# Patient Record
Sex: MALE | Race: WHITE | NOT HISPANIC OR LATINO
[De-identification: names, ages, dates, MRNs, and addresses within clinical notes are randomized per-mention and may not be internally consistent; named-entity substitution may affect disease eponyms.]

---

## 2022-10-26 ENCOUNTER — NON-APPOINTMENT (OUTPATIENT)
Age: 37
End: 2022-10-26

## 2023-03-05 ENCOUNTER — NON-APPOINTMENT (OUTPATIENT)
Age: 38
End: 2023-03-05

## 2024-05-24 PROBLEM — Z00.00 ENCOUNTER FOR PREVENTIVE HEALTH EXAMINATION: Status: ACTIVE | Noted: 2024-05-24

## 2024-05-29 ENCOUNTER — NON-APPOINTMENT (OUTPATIENT)
Age: 39
End: 2024-05-29

## 2024-05-30 ENCOUNTER — APPOINTMENT (OUTPATIENT)
Dept: BARIATRICS | Facility: CLINIC | Age: 39
End: 2024-05-30
Payer: COMMERCIAL

## 2024-05-30 VITALS
TEMPERATURE: 97.3 F | SYSTOLIC BLOOD PRESSURE: 130 MMHG | OXYGEN SATURATION: 97 % | WEIGHT: 315 LBS | HEART RATE: 70 BPM | DIASTOLIC BLOOD PRESSURE: 90 MMHG | HEIGHT: 68 IN | BODY MASS INDEX: 47.74 KG/M2

## 2024-05-30 DIAGNOSIS — Z87.891 PERSONAL HISTORY OF NICOTINE DEPENDENCE: ICD-10-CM

## 2024-05-30 DIAGNOSIS — Z86.39 PERSONAL HISTORY OF OTHER ENDOCRINE, NUTRITIONAL AND METABOLIC DISEASE: ICD-10-CM

## 2024-05-30 DIAGNOSIS — F32.A ANXIETY DISORDER, UNSPECIFIED: ICD-10-CM

## 2024-05-30 DIAGNOSIS — Z13.29 ENCOUNTER FOR SCREENING FOR OTHER SUSPECTED ENDOCRINE DISORDER: ICD-10-CM

## 2024-05-30 DIAGNOSIS — Z13.0 ENCOUNTER FOR SCREENING FOR OTHER SUSPECTED ENDOCRINE DISORDER: ICD-10-CM

## 2024-05-30 DIAGNOSIS — E66.01 MORBID (SEVERE) OBESITY DUE TO EXCESS CALORIES: ICD-10-CM

## 2024-05-30 DIAGNOSIS — F41.9 ANXIETY DISORDER, UNSPECIFIED: ICD-10-CM

## 2024-05-30 DIAGNOSIS — R63.8 OTHER SYMPTOMS AND SIGNS CONCERNING FOOD AND FLUID INTAKE: ICD-10-CM

## 2024-05-30 DIAGNOSIS — Z13.21 ENCOUNTER FOR SCREENING FOR NUTRITIONAL DISORDER: ICD-10-CM

## 2024-05-30 DIAGNOSIS — R63.2 POLYPHAGIA: ICD-10-CM

## 2024-05-30 DIAGNOSIS — G47.33 OBSTRUCTIVE SLEEP APNEA (ADULT) (PEDIATRIC): ICD-10-CM

## 2024-05-30 DIAGNOSIS — Z78.9 OTHER SPECIFIED HEALTH STATUS: ICD-10-CM

## 2024-05-30 DIAGNOSIS — Z82.0 FAMILY HISTORY OF EPILEPSY AND OTHER DISEASES OF THE NERVOUS SYSTEM: ICD-10-CM

## 2024-05-30 DIAGNOSIS — Z13.228 ENCOUNTER FOR SCREENING FOR OTHER SUSPECTED ENDOCRINE DISORDER: ICD-10-CM

## 2024-05-30 DIAGNOSIS — E46 UNSPECIFIED PROTEIN-CALORIE MALNUTRITION: ICD-10-CM

## 2024-05-30 DIAGNOSIS — R63.5 ABNORMAL WEIGHT GAIN: ICD-10-CM

## 2024-05-30 PROCEDURE — 99205 OFFICE O/P NEW HI 60 MIN: CPT

## 2024-05-30 RX ORDER — ZINC SULFATE 50(220)MG
CAPSULE ORAL
Refills: 0 | Status: ACTIVE | COMMUNITY

## 2024-05-30 RX ORDER — PSYLLIUM HUSK 0.4 G
CAPSULE ORAL
Refills: 0 | Status: ACTIVE | COMMUNITY

## 2024-05-30 RX ORDER — ESCITALOPRAM OXALATE 20 MG/1
TABLET ORAL DAILY
Refills: 0 | Status: ACTIVE | COMMUNITY

## 2024-05-30 NOTE — PHYSICAL EXAM
[Obese, well nourished, in no acute distress] : obese, well nourished, in no acute distress [Normal] : affect appropriate [de-identified] : Obese, soft, nontender, nondistended, positive bowel sounds in all four quadrants.  No hernia or masses.

## 2024-05-30 NOTE — ASSESSMENT
[FreeTextEntry1] : This patient presents today with a BMI of 56.56 kg/m2 and co-morbid conditions including obstructive sleep apnea and anxiety.    Today we have discussed the Risks, Benefits, and Alternatives to surgical intervention including Laparoscopic Adjustable Gastric Band, Laparoscopic Lidia-En-Y Gastric Bypass as well as Laparoscopic Sleeve Gastrectomy.  All questions have been answered.  Collectively we have determined this patient to be best suited for Laparoscopic Sleeve Gastrectomy.  Risk, Benefits, and Alternatives to surgery have been discussed.  This includes but is not limited to bleeding, infection, damage to adjacent structures, need for additional surgery or interventions, adverse effects of anesthesia such as cardio-respiratory complications, prolonged intubation, cardiac arrhythmia, arrest, and or death.  Risks of forgoing surgery have also been discussed including progression of, and/or worsening of current condition which may then require urgent or emergent treatment or surgery.  This process is quite extensive requiring the patient to undergo preoperative assessments including evaluation and documented weight history by his primary Care Physician, Evaluation and treatment by a Psychiatrist, Nutritionist, Cardiologist, Pulmonologist in addition to preoperative upper endoscopy. In addition, 3-6 consecutive months of medically managed preoperative weight loss counseling will also be required. Throughout this process I anticipate and would expect an EBL (Excess Body Weight Loss) between 10 and 15% prior to surgery.  Nutritional counseling has been provided. The patient is encouraged to remain calorie conscious and continue a low fat, low carbohydrate, high protein diet. Also, emphasized has been placed on the importance of adequate hydration and multi-vitamin supplementation and exercise.  (15 min)  Pending preoperative workup and clearance is in accordance with NIH criteria, this patient will be scheduled electively for Laparoscopic Sleeve Gastrectomy.  Preoperative protocols and referrals have been reviewed at length with the patient and all questions have been answered. Follow-up in one to 2 months to reassess weight loss progression and facilitate any additional preoperative clearances.  60 minutes discussing Morbid obesity, weight loss and management, surgical options for weight loss

## 2024-05-30 NOTE — HISTORY OF PRESENT ILLNESS
[de-identified] : This is a 39-year-old morbidly obese gentleman presenting today to discuss surgical options for weight loss. Despite multiple efforts at diet and exercise this patient has been unable to achieve and/or maintain significant weight loss.  Works in  for [employer]

## 2024-06-28 ENCOUNTER — OUTPATIENT (OUTPATIENT)
Dept: OUTPATIENT SERVICES | Facility: HOSPITAL | Age: 39
LOS: 1 days | End: 2024-06-28
Payer: COMMERCIAL

## 2024-06-28 ENCOUNTER — TRANSCRIPTION ENCOUNTER (OUTPATIENT)
Age: 39
End: 2024-06-28

## 2024-06-28 VITALS
DIASTOLIC BLOOD PRESSURE: 61 MMHG | OXYGEN SATURATION: 97 % | SYSTOLIC BLOOD PRESSURE: 115 MMHG | RESPIRATION RATE: 16 BRPM | HEART RATE: 70 BPM

## 2024-06-28 VITALS
DIASTOLIC BLOOD PRESSURE: 69 MMHG | OXYGEN SATURATION: 97 % | HEIGHT: 70 IN | TEMPERATURE: 97 F | RESPIRATION RATE: 25 BRPM | HEART RATE: 82 BPM | SYSTOLIC BLOOD PRESSURE: 144 MMHG | WEIGHT: 315 LBS

## 2024-06-28 DIAGNOSIS — R10.13 EPIGASTRIC PAIN: ICD-10-CM

## 2024-06-28 PROCEDURE — 88305 TISSUE EXAM BY PATHOLOGIST: CPT

## 2024-06-28 PROCEDURE — 94640 AIRWAY INHALATION TREATMENT: CPT

## 2024-06-28 PROCEDURE — 43239 EGD BIOPSY SINGLE/MULTIPLE: CPT

## 2024-06-28 PROCEDURE — 88305 TISSUE EXAM BY PATHOLOGIST: CPT | Mod: 26

## 2024-06-28 DEVICE — NET RETRV ROT ROTH 2.5MMX230CM: Type: IMPLANTABLE DEVICE | Status: FUNCTIONAL

## 2024-06-28 RX ORDER — ESCITALOPRAM OXALATE 20 MG/1
1 TABLET, FILM COATED ORAL
Refills: 0 | DISCHARGE

## 2024-06-28 RX ORDER — LIDOCAINE HYDROCHLORIDE 20 MG/ML
4 INJECTION, SOLUTION EPIDURAL; INFILTRATION; INTRACAUDAL; PERINEURAL ONCE
Refills: 0 | Status: COMPLETED | OUTPATIENT
Start: 2024-06-28 | End: 2024-06-28

## 2024-06-28 RX ADMIN — LIDOCAINE HYDROCHLORIDE 4 MILLILITER(S): 20 INJECTION, SOLUTION EPIDURAL; INFILTRATION; INTRACAUDAL; PERINEURAL at 15:07

## 2024-07-02 LAB — SURGICAL PATHOLOGY STUDY: SIGNIFICANT CHANGE UP

## 2024-07-05 ENCOUNTER — APPOINTMENT (OUTPATIENT)
Dept: PULMONOLOGY | Facility: CLINIC | Age: 39
End: 2024-07-05

## 2024-07-05 ENCOUNTER — APPOINTMENT (OUTPATIENT)
Dept: PULMONOLOGY | Facility: CLINIC | Age: 39
End: 2024-07-05
Payer: COMMERCIAL

## 2024-07-05 VITALS
WEIGHT: 315 LBS | SYSTOLIC BLOOD PRESSURE: 138 MMHG | HEART RATE: 74 BPM | BODY MASS INDEX: 47.74 KG/M2 | DIASTOLIC BLOOD PRESSURE: 85 MMHG | HEIGHT: 68 IN | OXYGEN SATURATION: 96 %

## 2024-07-05 PROBLEM — G47.33 OBSTRUCTIVE SLEEP APNEA (ADULT) (PEDIATRIC): Chronic | Status: ACTIVE | Noted: 2024-06-28

## 2024-07-05 PROCEDURE — 71046 X-RAY EXAM CHEST 2 VIEWS: CPT

## 2024-07-05 PROCEDURE — 94010 BREATHING CAPACITY TEST: CPT

## 2024-07-05 PROCEDURE — 94729 DIFFUSING CAPACITY: CPT

## 2024-07-05 PROCEDURE — 99204 OFFICE O/P NEW MOD 45 MIN: CPT | Mod: 25

## 2024-07-05 PROCEDURE — 94726 PLETHYSMOGRAPHY LUNG VOLUMES: CPT

## 2024-07-18 ENCOUNTER — APPOINTMENT (OUTPATIENT)
Dept: BARIATRICS | Facility: CLINIC | Age: 39
End: 2024-07-18
Payer: COMMERCIAL

## 2024-07-18 VITALS
BODY MASS INDEX: 47.74 KG/M2 | SYSTOLIC BLOOD PRESSURE: 130 MMHG | TEMPERATURE: 97.2 F | HEART RATE: 68 BPM | DIASTOLIC BLOOD PRESSURE: 70 MMHG | OXYGEN SATURATION: 97 % | HEIGHT: 68 IN | WEIGHT: 315 LBS

## 2024-07-18 DIAGNOSIS — G47.33 OBSTRUCTIVE SLEEP APNEA (ADULT) (PEDIATRIC): ICD-10-CM

## 2024-07-18 DIAGNOSIS — E66.01 MORBID (SEVERE) OBESITY DUE TO EXCESS CALORIES: ICD-10-CM

## 2024-07-18 PROCEDURE — 99214 OFFICE O/P EST MOD 30 MIN: CPT

## 2024-07-25 ENCOUNTER — APPOINTMENT (OUTPATIENT)
Dept: BARIATRICS/WEIGHT MGMT | Facility: CLINIC | Age: 39
End: 2024-07-25
Payer: COMMERCIAL

## 2024-07-25 DIAGNOSIS — Z78.9 OTHER SPECIFIED HEALTH STATUS: ICD-10-CM

## 2024-07-25 PROCEDURE — 90791 PSYCH DIAGNOSTIC EVALUATION: CPT | Mod: 95,GT

## 2024-07-25 NOTE — DISCUSSION/SUMMARY
[Educational materials provided] : Educational materials provided [Behavior plan developed] : Behavior plan developed [Strategies to improve adherence identified] : Strategies to improve adherence identified [Addtnl Health & Behavior Intervention: Group] : Additional Health and Behavior Intervention: Group [Develop and refine illness management to behavior plan] : Develop and refine illness management to behavior plan [Identify, practice refine strategies to promote adherence to regimen] : Identify, practice refine strategies to promote adherence to regimen [FreeTextEntry1] : Based on the information presented in this assessment, Mr. FOFANA does not have any current psychological contraindications for bariatric surgery. [de-identified] : OCD, by hx Psychological factors (overeating, poor dietary choices) affecting other medical conditions (obesity and associated medical sequelae) Obesity [FreeTextEntry5] : compliance with dietary and behavioral recommendations  [FreeTextEntry3] : Behavioral strategies were discussed to increase his coping skills and assist him in making lifestyle modifications.  Provided psychoeducational materials on changing eating behaviors in preparation for surgery.  It was recommended that he attend the post-surgery group sessions for further education and support to assist him in making lifestyle changes.  Additionally, it was recommended that he utilize writer and RD as needed to assist in making pre-surgical and post-surgical dietary and behavioral changes. [FreeTextEntry6] : reduction of obesity related co-morbidities; reduced risk of further medical sequelae of obesity

## 2024-07-25 NOTE — SOCIAL HISTORY
[Employed] : employed [Never ] : never  [College] : College [None] : none [FreeTextEntry1] : resides with a [facility] [FreeTextEntry2] : [employer]  [FreeTextEntry3] : no children

## 2024-07-25 NOTE — HISTORY OF PRESENT ILLNESS
[Large Portions] : large portions [Emotional Eating] : emotional eating [Decrease Activity] : decrease activity [Quantity Eating] : quantity eating [Poor Choices] : poor choices [de-identified] : Pt's motivation for surgery is to improve his health, including REGINO, and overall quality of life. Per pt,his highest weight was 372 lbs in May 2024 and he does not recall his lowest weight. His stated goal weight is 180-235 lbs and he expresses intent to make behavioral and dietary changes towards obtaining optimal results. [de-identified] : sleeve gastrectomy  [de-identified] :  9 mths:  speaking with others who have had bariatric surgery; online reading; discussions with surgeon; attending nutrition education class; and reading educational materials provided by surgeon [de-identified] : none.  Pt denies ED hx and bxs, including binge eating. [de-identified] : A current typical day of eating is reported as follows: B:  cereal or yogurt w/granola L:  cafeteria meal, sandwich or salad w/chicken D:  chicken, veggies and sweet potato/quinoa Drinks unsweetened ice tea, water, seltzer and diet soda. [de-identified] : low betsy diet, low fat diet SlimFast and going to the gym.  Despite multiple attempts at diet and exercise modifications, patient reports inability to achieve and maintain significant weight loss.

## 2024-07-25 NOTE — CURRENT PSYCHIATRIC SYMPTOMS
[Depressed Mood] : no depressed mood [Decreased Concentration] : no decrease in concentrating ability [Insomnia] : no insomnia disorder [Euphoria] : no euphoria [Dec Need For Sleep] : no decreased need for sleep [Thought Disorder] : ~T a thought disorder was not noted [Panic] : no panic disorder [de-identified] : diagnosed with OCD (obsessions only) and treated with medication and therapy [FreeTextEntry1] : Sought treatment for anxiety at 28 yrs old, initially with therapy and then Lexapro which he continues on.  Stopped therapy in 2020.  Lexapro is prescribed by his PCP.  Denies all othr psyc tx hx.

## 2024-07-25 NOTE — PHYSICAL EXAM
[Normal] : good [AH] : no auditory hallucinations [VH] : no visual hallucinations [Suicidal Ideation] : no suicidal ideation [Homicidal Ideation] : no homicidal ideation [FreeTextEntry8] : "pretty good"

## 2024-07-25 NOTE — REASON FOR VISIT
[Home] : at home, [unfilled] , at the time of the visit. [Other Location: e.g. Home (Enter Location, City,State)___] : at [unfilled] [Patient] : the patient [Initial Consult] : an initial consult for [Morbid Obesity (BMI>40)] : morbid obesity (bmi>40) [Referring By:  ___] : ~[address]~ was referred for psychological evaluation by Dr. OROPEZA [Attempted Weight Loss] : attempted weight loss [Commitment to Modified Lifestyle] : commitment to a modified lifestyle pre and post surgery [Difficulties with Diet Compliance] : difficulties with diet compliance  [Expectations of Outcome] : expectations of outcome [Motivation for Selecting Surgery] : motivation for selecting surgery [Strength of Social Support System] : strength of social support system [Patient Understands Data May be Shared] : patient understands that the information discussed during the evaluation would be shared with referring provider and possibly with ~his/her~ insurance provider [de-identified] : for evaluation of psychological appropriateness for bariatric surgery [de-identified] : Confidentiality and its limitations were explained.

## 2024-08-29 ENCOUNTER — APPOINTMENT (OUTPATIENT)
Dept: BARIATRICS | Facility: CLINIC | Age: 39
End: 2024-08-29
Payer: COMMERCIAL

## 2024-08-29 VITALS
SYSTOLIC BLOOD PRESSURE: 132 MMHG | HEART RATE: 59 BPM | TEMPERATURE: 96.9 F | WEIGHT: 315 LBS | BODY MASS INDEX: 47.74 KG/M2 | OXYGEN SATURATION: 96 % | HEIGHT: 68 IN | DIASTOLIC BLOOD PRESSURE: 80 MMHG

## 2024-08-29 DIAGNOSIS — G47.33 OBSTRUCTIVE SLEEP APNEA (ADULT) (PEDIATRIC): ICD-10-CM

## 2024-08-29 DIAGNOSIS — Z87.891 PERSONAL HISTORY OF NICOTINE DEPENDENCE: ICD-10-CM

## 2024-08-29 DIAGNOSIS — Z82.0 FAMILY HISTORY OF EPILEPSY AND OTHER DISEASES OF THE NERVOUS SYSTEM: ICD-10-CM

## 2024-08-29 PROCEDURE — 99213 OFFICE O/P EST LOW 20 MIN: CPT

## 2024-08-29 NOTE — PHYSICAL EXAM
[Obese, well nourished, in no acute distress] : obese, well nourished, in no acute distress [Normal] : affect appropriate [de-identified] : Obese, soft, nontender, nondistended, positive bowel sounds in all four quadrants.  No hernia or masses.

## 2024-08-29 NOTE — ASSESSMENT
[FreeTextEntry1] : Ongoing preoperative assessment in preparation for laparoscopic sleeve gastrectomy.  Patient continues to successfully lose weight.  Returns today having lost 5 pounds since her previous encounter.  He has seen in consultation with a psychologist the gastroenterologist pulmonologist as well as his cardiologist.  He has been formally diagnosed with obstructive sleep apnea and has demonstrated successful use of his CPAP.  He reports the endoscopy was otherwise unremarkable however those results remain pending as I have not yet received the GI evaluation.  Blood work is complete and has been reviewed.  No significant abnormalities.  He has been cleared to proceed with surgery by our psychologist.  The cardiologist will follow-up with him in the next week for echocardiogram and stress testing.  He is scheduled to see the nutritionist September 5.  Presently I see no obvious contraindications for surgery and will continue the process to prepare the patient appropriately for sleeve gastrectomy.  He will follow-up with me again in late September or early October upon which time I anticipate all remaining preoperative evaluations to have been completed and the patient cleared appropriately for surgery.  Once again, nutritional and weight loss counseling has been provided. The importance of weight reduction in the treatment of Obesity and its contribution to resolution of obesity related comorbidities has been discussed.  The patient is encouraged to remain calorie conscious and continue a low fat, low carbohydrate, high protein diet. Eat slowly and chew food well.  Avoid eating and drinking simultaneously.  Also, emphasis has been placed on the importance of adequate hydration, multi-vitamin supplementation and exercise.  (15 min)  Follow-up in 4 to 6 weeks

## 2024-08-29 NOTE — HISTORY OF PRESENT ILLNESS
[de-identified] : Ongoing preoperative assessment in preparation for laparoscopic sleeve gastrectomy

## 2024-09-05 ENCOUNTER — APPOINTMENT (OUTPATIENT)
Dept: BARIATRICS/WEIGHT MGMT | Facility: CLINIC | Age: 39
End: 2024-09-05
Payer: COMMERCIAL

## 2024-09-05 VITALS — BODY MASS INDEX: 47.74 KG/M2 | HEIGHT: 68 IN | WEIGHT: 315 LBS

## 2024-09-05 DIAGNOSIS — E66.01 MORBID (SEVERE) OBESITY DUE TO EXCESS CALORIES: ICD-10-CM

## 2024-09-05 PROCEDURE — 97802 MEDICAL NUTRITION INDIV IN: CPT | Mod: 95

## 2024-09-26 ENCOUNTER — APPOINTMENT (OUTPATIENT)
Dept: BARIATRICS | Facility: CLINIC | Age: 39
End: 2024-09-26
Payer: COMMERCIAL

## 2024-09-26 VITALS
OXYGEN SATURATION: 96 % | DIASTOLIC BLOOD PRESSURE: 86 MMHG | WEIGHT: 315 LBS | SYSTOLIC BLOOD PRESSURE: 142 MMHG | BODY MASS INDEX: 47.74 KG/M2 | HEIGHT: 68 IN | TEMPERATURE: 97.3 F | HEART RATE: 79 BPM

## 2024-09-26 DIAGNOSIS — E66.01 MORBID (SEVERE) OBESITY DUE TO EXCESS CALORIES: ICD-10-CM

## 2024-09-26 DIAGNOSIS — R63.5 ABNORMAL WEIGHT GAIN: ICD-10-CM

## 2024-09-26 DIAGNOSIS — G47.33 OBSTRUCTIVE SLEEP APNEA (ADULT) (PEDIATRIC): ICD-10-CM

## 2024-09-26 PROCEDURE — 99213 OFFICE O/P EST LOW 20 MIN: CPT

## 2024-09-26 NOTE — PHYSICAL EXAM
[Obese, well nourished, in no acute distress] : obese, well nourished, in no acute distress [Normal] : affect appropriate [de-identified] : Obese, soft, nontender, nondistended, positive bowel sounds in all four quadrants.  No hernia or masses.

## 2024-09-26 NOTE — HISTORY OF PRESENT ILLNESS
[de-identified] : Ongoing preoperative assessment in anticipation for laparoscopic sleeve gastrectomy

## 2024-09-26 NOTE — ASSESSMENT
[FreeTextEntry1] : Preoperative workup near complete.  Patient has obtained all necessary evaluations with the exception of his cardiac clearance which is in progress.  He had undergone an outpatient echocardiogram and EKG with his cardiologist but demonstrated a questionable abnormality of right ventricular function.  Cardiologist recommended an outpatient cardiac MRI.  This is scheduled for late October after which the patient will follow-up with his cardiologist for reevaluation and clearance for surgery.  As such the patient will be tentatively scheduled for his procedure early December.  Of course he will follow-up with me upon completion of his cardiac evaluation so we can again review preoperative modified liquid diet, presurgical testing and ensure that he continues to lose weight in preparation for surgery  Nutritional and weight loss counseling has been provided. The importance of weight reduction in the treatment of Obesity and its contribution to resolution of obesity related comorbidities has been discussed.  The patient is encouraged to remain calorie conscious and continue a low fat, low carbohydrate, high protein diet. Eat slowly and chew food well.  Avoid eating and drinking simultaneously.  Also, emphasis has been placed on the importance of adequate hydration, multi-vitamin supplementation and exercise.  (15 min)  Follow-up in November upon completion of his cardiac assessment and MRI as requested.

## 2024-09-30 RX ORDER — PNV NO.95/FERROUS FUM/FOLIC AC 28MG-0.8MG
TABLET ORAL DAILY
Refills: 0 | Status: ACTIVE | COMMUNITY

## 2024-09-30 RX ORDER — MAGNESIUM OXIDE/MAG AA CHELATE 300 MG
CAPSULE ORAL DAILY
Refills: 0 | Status: ACTIVE | COMMUNITY

## 2024-10-28 ENCOUNTER — APPOINTMENT (OUTPATIENT)
Dept: MRI IMAGING | Facility: CLINIC | Age: 39
End: 2024-10-28
Payer: COMMERCIAL

## 2024-10-28 ENCOUNTER — OUTPATIENT (OUTPATIENT)
Dept: OUTPATIENT SERVICES | Facility: HOSPITAL | Age: 39
LOS: 1 days | End: 2024-10-28

## 2024-10-28 DIAGNOSIS — Z00.8 ENCOUNTER FOR OTHER GENERAL EXAMINATION: ICD-10-CM

## 2024-10-28 PROCEDURE — 75565 CARD MRI VELOC FLOW MAPPING: CPT | Mod: 26

## 2024-10-28 PROCEDURE — 75561 CARDIAC MRI FOR MORPH W/DYE: CPT | Mod: 26

## 2024-11-13 ENCOUNTER — TRANSCRIPTION ENCOUNTER (OUTPATIENT)
Age: 39
End: 2024-11-13

## 2024-11-14 ENCOUNTER — OUTPATIENT (OUTPATIENT)
Dept: OUTPATIENT SERVICES | Facility: HOSPITAL | Age: 39
LOS: 1 days | End: 2024-11-14
Payer: COMMERCIAL

## 2024-11-14 ENCOUNTER — TRANSCRIPTION ENCOUNTER (OUTPATIENT)
Age: 39
End: 2024-11-14

## 2024-11-14 VITALS
RESPIRATION RATE: 18 BRPM | HEART RATE: 80 BPM | OXYGEN SATURATION: 96 % | DIASTOLIC BLOOD PRESSURE: 73 MMHG | SYSTOLIC BLOOD PRESSURE: 127 MMHG

## 2024-11-14 VITALS
DIASTOLIC BLOOD PRESSURE: 56 MMHG | SYSTOLIC BLOOD PRESSURE: 108 MMHG | OXYGEN SATURATION: 97 % | HEART RATE: 67 BPM | TEMPERATURE: 98 F

## 2024-11-14 DIAGNOSIS — I27.20 PULMONARY HYPERTENSION, UNSPECIFIED: ICD-10-CM

## 2024-11-14 LAB
ANION GAP SERPL CALC-SCNC: 10 MMOL/L — SIGNIFICANT CHANGE UP (ref 5–17)
BUN SERPL-MCNC: 15 MG/DL — SIGNIFICANT CHANGE UP (ref 7–23)
CALCIUM SERPL-MCNC: 9.4 MG/DL — SIGNIFICANT CHANGE UP (ref 8.4–10.5)
CHLORIDE SERPL-SCNC: 103 MMOL/L — SIGNIFICANT CHANGE UP (ref 96–108)
CO2 SERPL-SCNC: 28 MMOL/L — SIGNIFICANT CHANGE UP (ref 22–31)
CREAT SERPL-MCNC: 1.11 MG/DL — SIGNIFICANT CHANGE UP (ref 0.5–1.3)
EGFR: 87 ML/MIN/1.73M2 — SIGNIFICANT CHANGE UP
GLUCOSE SERPL-MCNC: 106 MG/DL — HIGH (ref 70–99)
HCT VFR BLD CALC: 48 % — SIGNIFICANT CHANGE UP (ref 39–50)
HGB BLD-MCNC: 16.3 G/DL — SIGNIFICANT CHANGE UP (ref 13–17)
MCHC RBC-ENTMCNC: 27.9 PG — SIGNIFICANT CHANGE UP (ref 27–34)
MCHC RBC-ENTMCNC: 34 G/DL — SIGNIFICANT CHANGE UP (ref 32–36)
MCV RBC AUTO: 82.2 FL — SIGNIFICANT CHANGE UP (ref 80–100)
NRBC # BLD: 0 /100 WBCS — SIGNIFICANT CHANGE UP (ref 0–0)
PLATELET # BLD AUTO: 263 K/UL — SIGNIFICANT CHANGE UP (ref 150–400)
POTASSIUM SERPL-MCNC: 4.2 MMOL/L — SIGNIFICANT CHANGE UP (ref 3.5–5.3)
POTASSIUM SERPL-SCNC: 4.2 MMOL/L — SIGNIFICANT CHANGE UP (ref 3.5–5.3)
RBC # BLD: 5.84 M/UL — HIGH (ref 4.2–5.8)
RBC # FLD: 13.8 % — SIGNIFICANT CHANGE UP (ref 10.3–14.5)
SODIUM SERPL-SCNC: 141 MMOL/L — SIGNIFICANT CHANGE UP (ref 135–145)
WBC # BLD: 6.31 K/UL — SIGNIFICANT CHANGE UP (ref 3.8–10.5)
WBC # FLD AUTO: 6.31 K/UL — SIGNIFICANT CHANGE UP (ref 3.8–10.5)

## 2024-11-14 PROCEDURE — C9600: CPT | Mod: LD

## 2024-11-14 PROCEDURE — 82803 BLOOD GASES ANY COMBINATION: CPT

## 2024-11-14 PROCEDURE — 93010 ELECTROCARDIOGRAM REPORT: CPT

## 2024-11-14 PROCEDURE — 93458 L HRT ARTERY/VENTRICLE ANGIO: CPT | Mod: 59

## 2024-11-14 PROCEDURE — 99152 MOD SED SAME PHYS/QHP 5/>YRS: CPT

## 2024-11-14 PROCEDURE — 92928 PRQ TCAT PLMT NTRAC ST 1 LES: CPT | Mod: LD

## 2024-11-14 PROCEDURE — 93005 ELECTROCARDIOGRAM TRACING: CPT

## 2024-11-14 PROCEDURE — 80048 BASIC METABOLIC PNL TOTAL CA: CPT

## 2024-11-14 PROCEDURE — 93458 L HRT ARTERY/VENTRICLE ANGIO: CPT | Mod: 26,59

## 2024-11-14 PROCEDURE — 85027 COMPLETE CBC AUTOMATED: CPT

## 2024-11-14 PROCEDURE — 36415 COLL VENOUS BLD VENIPUNCTURE: CPT

## 2024-11-14 RX ORDER — SODIUM CHLORIDE 9 MG/ML
250 INJECTION, SOLUTION INTRAMUSCULAR; INTRAVENOUS; SUBCUTANEOUS ONCE
Refills: 0 | Status: COMPLETED | OUTPATIENT
Start: 2024-11-14 | End: 2024-11-14

## 2024-11-14 RX ADMIN — SODIUM CHLORIDE 500 MILLILITER(S): 9 INJECTION, SOLUTION INTRAMUSCULAR; INTRAVENOUS; SUBCUTANEOUS at 11:39

## 2024-11-14 NOTE — ASU DISCHARGE PLAN (ADULT/PEDIATRIC) - ASU DC SPECIAL INSTRUCTIONSFT

## 2024-11-14 NOTE — ASU DISCHARGE PLAN (ADULT/PEDIATRIC) - FINANCIAL ASSISTANCE
[facility] provides services at a reduced cost to those who are determined to be eligible through [facility]’s financial assistance program. Information regarding [facility]’s financial assistance program can be found by going to [email or web address] or by calling [phone].

## 2024-11-14 NOTE — ASU DISCHARGE PLAN (ADULT/PEDIATRIC) - CARE PROVIDER_API CALL
Jatin Perry  Cardiovascular Disease  [address] [facility], [address] [address]  Phone: [phone]  Fax: [phone]  Established Patient  Follow Up Time: 2 weeks

## 2024-11-14 NOTE — H&P CARDIOLOGY - HISTORY OF PRESENT ILLNESS
40 y/o M with Pmhx of REGINO on CPAP, pulm HTN, obesity, presents today for L/RHC due to pulmonary HTN for pre- op clearance prior to upcoming bariatric surgery.      Cardiology: Dr. Jatin Perry

## 2024-11-15 LAB
HGB BLDA-MCNC: 15.7 G/DL — SIGNIFICANT CHANGE UP (ref 12.6–17.4)
OXYHGB MFR BLDA: 96.8 % — HIGH (ref 90–95)
SAO2 % BLDA: 99.4 % — HIGH (ref 94–98)

## 2024-11-21 ENCOUNTER — APPOINTMENT (OUTPATIENT)
Dept: BARIATRICS | Facility: CLINIC | Age: 39
End: 2024-11-21
Payer: COMMERCIAL

## 2024-11-21 VITALS
SYSTOLIC BLOOD PRESSURE: 122 MMHG | OXYGEN SATURATION: 98 % | DIASTOLIC BLOOD PRESSURE: 76 MMHG | BODY MASS INDEX: 47.74 KG/M2 | TEMPERATURE: 97.2 F | HEART RATE: 62 BPM | HEIGHT: 68 IN | WEIGHT: 315 LBS

## 2024-11-21 DIAGNOSIS — G47.33 OBSTRUCTIVE SLEEP APNEA (ADULT) (PEDIATRIC): ICD-10-CM

## 2024-11-21 DIAGNOSIS — E66.01 MORBID (SEVERE) OBESITY DUE TO EXCESS CALORIES: ICD-10-CM

## 2024-11-21 PROBLEM — I27.20 PULMONARY HYPERTENSION, UNSPECIFIED: Chronic | Status: ACTIVE | Noted: 2024-11-14

## 2024-11-21 PROBLEM — E66.9 OBESITY, UNSPECIFIED: Chronic | Status: ACTIVE | Noted: 2024-11-14

## 2024-11-21 PROCEDURE — 99215 OFFICE O/P EST HI 40 MIN: CPT

## 2024-11-21 RX ORDER — SACUBITRIL AND VALSARTAN 24; 26 MG/1; MG/1
24-26 TABLET, FILM COATED ORAL TWICE DAILY
Refills: 0 | Status: ACTIVE | COMMUNITY

## 2024-11-21 RX ORDER — METOPROLOL SUCCINATE 25 MG/1
25 TABLET, EXTENDED RELEASE ORAL DAILY
Refills: 0 | Status: ACTIVE | COMMUNITY

## 2024-11-27 ENCOUNTER — OUTPATIENT (OUTPATIENT)
Dept: OUTPATIENT SERVICES | Facility: HOSPITAL | Age: 39
LOS: 1 days | End: 2024-11-27
Payer: COMMERCIAL

## 2024-11-27 VITALS
RESPIRATION RATE: 16 BRPM | OXYGEN SATURATION: 97 % | TEMPERATURE: 98 F | DIASTOLIC BLOOD PRESSURE: 79 MMHG | HEART RATE: 71 BPM | HEIGHT: 69 IN | WEIGHT: 315 LBS | SYSTOLIC BLOOD PRESSURE: 119 MMHG

## 2024-11-27 DIAGNOSIS — E66.01 MORBID (SEVERE) OBESITY DUE TO EXCESS CALORIES: ICD-10-CM

## 2024-11-27 DIAGNOSIS — E66.9 OBESITY, UNSPECIFIED: ICD-10-CM

## 2024-11-27 DIAGNOSIS — G47.33 OBSTRUCTIVE SLEEP APNEA (ADULT) (PEDIATRIC): ICD-10-CM

## 2024-11-27 DIAGNOSIS — Z01.818 ENCOUNTER FOR OTHER PREPROCEDURAL EXAMINATION: ICD-10-CM

## 2024-11-27 LAB
ALBUMIN SERPL ELPH-MCNC: 3.5 G/DL — SIGNIFICANT CHANGE UP (ref 3.3–5)
ALP SERPL-CCNC: 71 U/L — SIGNIFICANT CHANGE UP (ref 30–120)
ALT FLD-CCNC: 33 U/L — SIGNIFICANT CHANGE UP (ref 10–60)
ANION GAP SERPL CALC-SCNC: 5 MMOL/L — SIGNIFICANT CHANGE UP (ref 5–17)
AST SERPL-CCNC: 15 U/L — SIGNIFICANT CHANGE UP (ref 10–40)
BILIRUB SERPL-MCNC: 0.5 MG/DL — SIGNIFICANT CHANGE UP (ref 0.2–1.2)
BLD GP AB SCN SERPL QL: SIGNIFICANT CHANGE UP
BUN SERPL-MCNC: 13 MG/DL — SIGNIFICANT CHANGE UP (ref 7–23)
CALCIUM SERPL-MCNC: 9.4 MG/DL — SIGNIFICANT CHANGE UP (ref 8.4–10.5)
CHLORIDE SERPL-SCNC: 105 MMOL/L — SIGNIFICANT CHANGE UP (ref 96–108)
CO2 SERPL-SCNC: 33 MMOL/L — HIGH (ref 22–31)
CREAT SERPL-MCNC: 1.12 MG/DL — SIGNIFICANT CHANGE UP (ref 0.5–1.3)
EGFR: 86 ML/MIN/1.73M2 — SIGNIFICANT CHANGE UP
GLUCOSE SERPL-MCNC: 109 MG/DL — HIGH (ref 70–99)
HCT VFR BLD CALC: 46.1 % — SIGNIFICANT CHANGE UP (ref 39–50)
HGB BLD-MCNC: 15.5 G/DL — SIGNIFICANT CHANGE UP (ref 13–17)
MCHC RBC-ENTMCNC: 27.8 PG — SIGNIFICANT CHANGE UP (ref 27–34)
MCHC RBC-ENTMCNC: 33.6 G/DL — SIGNIFICANT CHANGE UP (ref 32–36)
MCV RBC AUTO: 82.6 FL — SIGNIFICANT CHANGE UP (ref 80–100)
NRBC # BLD: 0 /100 WBCS — SIGNIFICANT CHANGE UP (ref 0–0)
PLATELET # BLD AUTO: 247 K/UL — SIGNIFICANT CHANGE UP (ref 150–400)
POTASSIUM SERPL-MCNC: 5 MMOL/L — SIGNIFICANT CHANGE UP (ref 3.5–5.3)
POTASSIUM SERPL-SCNC: 5 MMOL/L — SIGNIFICANT CHANGE UP (ref 3.5–5.3)
PROT SERPL-MCNC: 7.1 G/DL — SIGNIFICANT CHANGE UP (ref 6–8.3)
RBC # BLD: 5.58 M/UL — SIGNIFICANT CHANGE UP (ref 4.2–5.8)
RBC # FLD: 13.9 % — SIGNIFICANT CHANGE UP (ref 10.3–14.5)
SODIUM SERPL-SCNC: 143 MMOL/L — SIGNIFICANT CHANGE UP (ref 135–145)
WBC # BLD: 6.11 K/UL — SIGNIFICANT CHANGE UP (ref 3.8–10.5)
WBC # FLD AUTO: 6.11 K/UL — SIGNIFICANT CHANGE UP (ref 3.8–10.5)

## 2024-11-27 PROCEDURE — 86850 RBC ANTIBODY SCREEN: CPT

## 2024-11-27 PROCEDURE — G0463: CPT

## 2024-11-27 PROCEDURE — 85027 COMPLETE CBC AUTOMATED: CPT

## 2024-11-27 PROCEDURE — 80053 COMPREHEN METABOLIC PANEL: CPT

## 2024-11-27 PROCEDURE — 36415 COLL VENOUS BLD VENIPUNCTURE: CPT

## 2024-11-27 PROCEDURE — 86901 BLOOD TYPING SEROLOGIC RH(D): CPT

## 2024-11-27 PROCEDURE — 86900 BLOOD TYPING SEROLOGIC ABO: CPT

## 2024-11-27 RX ORDER — ESCITALOPRAM 10 MG/1
1.5 TABLET, FILM COATED ORAL
Refills: 0 | DISCHARGE

## 2024-11-27 RX ORDER — ONDANSETRON 4 MG/1
4 TABLET, ORALLY DISINTEGRATING ORAL EVERY 8 HOURS
Qty: 15 | Refills: 0 | Status: ACTIVE | COMMUNITY
Start: 2024-11-27 | End: 1900-01-01

## 2024-11-27 RX ORDER — OXYCODONE 5 MG/1
5 TABLET ORAL EVERY 6 HOURS
Qty: 3 | Refills: 0 | Status: COMPLETED | COMMUNITY
Start: 2024-11-27 | End: 2024-11-28

## 2024-11-27 RX ORDER — ASPIRIN/MAG CARB/ALUMINUM AMIN 325 MG
0 TABLET ORAL
Refills: 0 | DISCHARGE

## 2024-11-27 RX ORDER — OMEPRAZOLE 20 MG/1
20 CAPSULE, DELAYED RELEASE ORAL DAILY
Qty: 30 | Refills: 0 | Status: ACTIVE | COMMUNITY
Start: 2024-11-27 | End: 1900-01-01

## 2024-11-27 RX ORDER — METOPROLOL TARTRATE 50 MG
1 TABLET ORAL
Refills: 0 | DISCHARGE

## 2024-11-27 RX ORDER — SACUBITRIL AND VALSARTAN 97; 103 MG/1; MG/1
1 TABLET, FILM COATED ORAL
Refills: 0 | DISCHARGE

## 2024-11-27 NOTE — H&P PST ADULT - NSICDXFAMILYHX_GEN_ALL_CORE_FT
FAMILY HISTORY:  Father  Still living? No  Family history of stroke, Age at diagnosis: Age Unknown    Mother  Still living? Yes, Estimated age: 61-70  Family history of multiple sclerosis, Age at diagnosis: Age Unknown

## 2024-11-28 PROBLEM — I27.20 PULMONARY HYPERTENSION, UNSPECIFIED: Chronic | Status: INACTIVE | Noted: 2024-11-14 | Resolved: 2024-11-27

## 2024-12-03 PROBLEM — I51.7 CARDIOMEGALY: Chronic | Status: ACTIVE | Noted: 2024-11-27

## 2024-12-11 ENCOUNTER — TRANSCRIPTION ENCOUNTER (OUTPATIENT)
Age: 39
End: 2024-12-11

## 2024-12-11 ENCOUNTER — APPOINTMENT (OUTPATIENT)
Dept: BARIATRICS | Facility: HOSPITAL | Age: 39
End: 2024-12-11

## 2024-12-11 ENCOUNTER — RESULT REVIEW (OUTPATIENT)
Age: 39
End: 2024-12-11

## 2024-12-11 ENCOUNTER — INPATIENT (INPATIENT)
Facility: HOSPITAL | Age: 39
LOS: 0 days | Discharge: HOME | DRG: 641 | End: 2024-12-12
Attending: SURGERY | Admitting: SURGERY
Payer: COMMERCIAL

## 2024-12-11 VITALS
OXYGEN SATURATION: 98 % | HEIGHT: 69 IN | DIASTOLIC BLOOD PRESSURE: 53 MMHG | SYSTOLIC BLOOD PRESSURE: 106 MMHG | HEART RATE: 63 BPM | WEIGHT: 315 LBS | TEMPERATURE: 98 F | RESPIRATION RATE: 14 BRPM

## 2024-12-11 DIAGNOSIS — Z98.890 OTHER SPECIFIED POSTPROCEDURAL STATES: Chronic | ICD-10-CM

## 2024-12-11 DIAGNOSIS — E66.01 MORBID (SEVERE) OBESITY DUE TO EXCESS CALORIES: ICD-10-CM

## 2024-12-11 PROCEDURE — 88307 TISSUE EXAM BY PATHOLOGIST: CPT | Mod: 26

## 2024-12-11 PROCEDURE — 43775 LAP SLEEVE GASTRECTOMY: CPT | Mod: AS

## 2024-12-11 PROCEDURE — 43775 LAP SLEEVE GASTRECTOMY: CPT

## 2024-12-11 PROCEDURE — 99222 1ST HOSP IP/OBS MODERATE 55: CPT

## 2024-12-11 DEVICE — STAPLER COVIDIEN TRI-STAPLE 45MM BLACK INTELLIGENT RELOAD: Type: IMPLANTABLE DEVICE | Status: FUNCTIONAL

## 2024-12-11 DEVICE — STAPLER COVIDIEN TRI-STAPLE 60MM BLACK INTELLIGENT RELOAD: Type: IMPLANTABLE DEVICE | Status: FUNCTIONAL

## 2024-12-11 DEVICE — SEALANT VISTASEAL FIBRIN HUMAN 10ML 12/KIT: Type: IMPLANTABLE DEVICE | Status: FUNCTIONAL

## 2024-12-11 RX ORDER — FOSAPREPITANT 150 MG/5ML
150 INJECTION, POWDER, LYOPHILIZED, FOR SOLUTION INTRAVENOUS ONCE
Refills: 0 | Status: COMPLETED | OUTPATIENT
Start: 2024-12-11 | End: 2024-12-11

## 2024-12-11 RX ORDER — ACETAMINOPHEN 500MG 500 MG/1
1000 TABLET, COATED ORAL ONCE
Refills: 0 | Status: COMPLETED | OUTPATIENT
Start: 2024-12-11 | End: 2024-12-11

## 2024-12-11 RX ORDER — ONDANSETRON HYDROCHLORIDE 4 MG/1
4 TABLET, FILM COATED ORAL ONCE
Refills: 0 | Status: COMPLETED | OUTPATIENT
Start: 2024-12-11 | End: 2024-12-11

## 2024-12-11 RX ORDER — HYOSCYAMINE SULFATE 0.125 MG
0.12 TABLET, SUBLINGUAL SUBLINGUAL EVERY 6 HOURS
Refills: 0 | Status: DISCONTINUED | OUTPATIENT
Start: 2024-12-11 | End: 2024-12-12

## 2024-12-11 RX ORDER — CEFAZOLIN SODIUM 10 G
3000 VIAL (EA) INJECTION ONCE
Refills: 0 | Status: COMPLETED | OUTPATIENT
Start: 2024-12-11 | End: 2024-12-11

## 2024-12-11 RX ORDER — ACETAMINOPHEN 500MG 500 MG/1
1000 TABLET, COATED ORAL ONCE
Refills: 0 | Status: COMPLETED | OUTPATIENT
Start: 2024-12-12 | End: 2024-12-12

## 2024-12-11 RX ORDER — 0.9 % SODIUM CHLORIDE 0.9 %
1000 INTRAVENOUS SOLUTION INTRAVENOUS
Refills: 0 | Status: DISCONTINUED | OUTPATIENT
Start: 2024-12-11 | End: 2024-12-11

## 2024-12-11 RX ORDER — HYDROMORPHONE HYDROCHLORIDE 2 MG/1
0.5 TABLET ORAL EVERY 6 HOURS
Refills: 0 | Status: DISCONTINUED | OUTPATIENT
Start: 2024-12-11 | End: 2024-12-12

## 2024-12-11 RX ORDER — ACETAMINOPHEN 500MG 500 MG/1
1000 TABLET, COATED ORAL ONCE
Refills: 0 | Status: DISCONTINUED | OUTPATIENT
Start: 2024-12-12 | End: 2024-12-12

## 2024-12-11 RX ORDER — CEFAZOLIN SODIUM 10 G
2000 VIAL (EA) INJECTION ONCE
Refills: 0 | Status: DISCONTINUED | OUTPATIENT
Start: 2024-12-11 | End: 2024-12-11

## 2024-12-11 RX ORDER — ENOXAPARIN SODIUM 30 MG/.3ML
40 INJECTION SUBCUTANEOUS EVERY 24 HOURS
Refills: 0 | Status: DISCONTINUED | OUTPATIENT
Start: 2024-12-12 | End: 2024-12-12

## 2024-12-11 RX ORDER — CARVEDILOL 25 MG/1
3.12 TABLET, FILM COATED ORAL EVERY 12 HOURS
Refills: 0 | Status: DISCONTINUED | OUTPATIENT
Start: 2024-12-12 | End: 2024-12-12

## 2024-12-11 RX ORDER — SACUBITRIL AND VALSARTAN 24; 26 MG/1; MG/1
1 TABLET, FILM COATED ORAL
Refills: 0 | Status: DISCONTINUED | OUTPATIENT
Start: 2024-12-11 | End: 2024-12-12

## 2024-12-11 RX ORDER — ESCITALOPRAM OXALATE 10 MG/1
10 TABLET, FILM COATED ORAL DAILY
Refills: 0 | Status: DISCONTINUED | OUTPATIENT
Start: 2024-12-11 | End: 2024-12-12

## 2024-12-11 RX ORDER — IBUPROFEN 200 MG
800 TABLET ORAL EVERY 6 HOURS
Refills: 0 | Status: DISCONTINUED | OUTPATIENT
Start: 2024-12-11 | End: 2024-12-12

## 2024-12-11 RX ORDER — PANTOPRAZOLE SODIUM 40 MG/1
40 TABLET, DELAYED RELEASE ORAL DAILY
Refills: 0 | Status: DISCONTINUED | OUTPATIENT
Start: 2024-12-11 | End: 2024-12-12

## 2024-12-11 RX ORDER — SODIUM CHLORIDE 9 MG/ML
2000 INJECTION, SOLUTION INTRAMUSCULAR; INTRAVENOUS; SUBCUTANEOUS ONCE
Refills: 0 | Status: COMPLETED | OUTPATIENT
Start: 2024-12-11 | End: 2024-12-11

## 2024-12-11 RX ORDER — OXYCODONE HYDROCHLORIDE 30 MG/1
5 TABLET ORAL ONCE
Refills: 0 | Status: DISCONTINUED | OUTPATIENT
Start: 2024-12-11 | End: 2024-12-11

## 2024-12-11 RX ORDER — ENOXAPARIN SODIUM 30 MG/.3ML
40 INJECTION SUBCUTANEOUS ONCE
Refills: 0 | Status: COMPLETED | OUTPATIENT
Start: 2024-12-11 | End: 2024-12-11

## 2024-12-11 RX ORDER — ONDANSETRON HYDROCHLORIDE 4 MG/1
4 TABLET, FILM COATED ORAL EVERY 6 HOURS
Refills: 0 | Status: DISCONTINUED | OUTPATIENT
Start: 2024-12-11 | End: 2024-12-12

## 2024-12-11 RX ORDER — HYDROMORPHONE HYDROCHLORIDE 2 MG/1
0.5 TABLET ORAL
Refills: 0 | Status: DISCONTINUED | OUTPATIENT
Start: 2024-12-11 | End: 2024-12-11

## 2024-12-11 RX ORDER — SIMETHICONE 125 MG
80 CAPSULE ORAL EVERY 8 HOURS
Refills: 0 | Status: DISCONTINUED | OUTPATIENT
Start: 2024-12-11 | End: 2024-12-12

## 2024-12-11 RX ORDER — 0.9 % SODIUM CHLORIDE 0.9 %
1000 INTRAVENOUS SOLUTION INTRAVENOUS
Refills: 0 | Status: DISCONTINUED | OUTPATIENT
Start: 2024-12-11 | End: 2024-12-12

## 2024-12-11 RX ORDER — CARVEDILOL 25 MG/1
1 TABLET, FILM COATED ORAL
Refills: 0 | DISCHARGE

## 2024-12-11 RX ADMIN — Medication 400 MILLIGRAM(S): at 18:33

## 2024-12-11 RX ADMIN — Medication 80 MILLIGRAM(S): at 14:15

## 2024-12-11 RX ADMIN — HYDROMORPHONE HYDROCHLORIDE 0.5 MILLIGRAM(S): 2 TABLET ORAL at 10:02

## 2024-12-11 RX ADMIN — HYDROMORPHONE HYDROCHLORIDE 0.5 MILLIGRAM(S): 2 TABLET ORAL at 11:09

## 2024-12-11 RX ADMIN — SACUBITRIL AND VALSARTAN 1 TABLET(S): 24; 26 TABLET, FILM COATED ORAL at 18:34

## 2024-12-11 RX ADMIN — SODIUM CHLORIDE 1000 MILLILITER(S): 9 INJECTION, SOLUTION INTRAMUSCULAR; INTRAVENOUS; SUBCUTANEOUS at 06:25

## 2024-12-11 RX ADMIN — Medication 150 MILLILITER(S): at 14:15

## 2024-12-11 RX ADMIN — ESCITALOPRAM OXALATE 10 MILLIGRAM(S): 10 TABLET, FILM COATED ORAL at 20:55

## 2024-12-11 RX ADMIN — Medication 400 MILLIGRAM(S): at 11:51

## 2024-12-11 RX ADMIN — Medication 800 MILLIGRAM(S): at 19:01

## 2024-12-11 RX ADMIN — ACETAMINOPHEN 500MG 1000 MILLIGRAM(S): 500 TABLET, COATED ORAL at 17:00

## 2024-12-11 RX ADMIN — ACETAMINOPHEN 500MG 400 MILLIGRAM(S): 500 TABLET, COATED ORAL at 16:54

## 2024-12-11 RX ADMIN — PANTOPRAZOLE SODIUM 40 MILLIGRAM(S): 40 TABLET, DELAYED RELEASE ORAL at 11:46

## 2024-12-11 RX ADMIN — FOSAPREPITANT 300 MILLIGRAM(S): 150 INJECTION, POWDER, LYOPHILIZED, FOR SOLUTION INTRAVENOUS at 06:30

## 2024-12-11 RX ADMIN — ACETAMINOPHEN 500MG 400 MILLIGRAM(S): 500 TABLET, COATED ORAL at 20:47

## 2024-12-11 RX ADMIN — ONDANSETRON HYDROCHLORIDE 4 MILLIGRAM(S): 4 TABLET, FILM COATED ORAL at 10:02

## 2024-12-11 RX ADMIN — Medication 150 MILLILITER(S): at 20:47

## 2024-12-11 RX ADMIN — Medication 80 MILLIGRAM(S): at 22:45

## 2024-12-11 RX ADMIN — ENOXAPARIN SODIUM 40 MILLIGRAM(S): 30 INJECTION SUBCUTANEOUS at 06:46

## 2024-12-11 RX ADMIN — ACETAMINOPHEN 500MG 1000 MILLIGRAM(S): 500 TABLET, COATED ORAL at 21:47

## 2024-12-11 NOTE — DISCHARGE NOTE PROVIDER - CARE PROVIDER_API CALL
Saad Aparicio  Surgery  [address] [place] [id number]  Phone: [phone]  Fax: [phone]  Follow Up Time:

## 2024-12-11 NOTE — ASU DISCHARGE PLAN (ADULT/PEDIATRIC) - FINANCIAL ASSISTANCE
[facility] provides services at a reduced cost to those who are determined to be eligible through [facility]’s financial assistance program. Information regarding [facility]’s financial assistance program can be found by going to [email or web address] or by calling [phone].

## 2024-12-11 NOTE — ASU PREOP CHECKLIST - SURGICAL CONSENT
Anesthesia Evaluation     history of anesthetic complications:  PONV               Airway   Mallampati: II  Dental      Pulmonary    (+) ,sleep apnea    ROS comment: Positive TAYLOR screen/Positive TAYLOR diagnosis and 2 or more mitigating factors used (recovery in non-supine position and multimodal analgesia)    Cardiovascular     (+) hypertensionPVD, hyperlipidemia      Neuro/Psych  (+) seizures well controlled, headaches, psychiatric history  GI/Hepatic/Renal/Endo    (+) obesity, renal disease, diabetes mellitus    Musculoskeletal     Abdominal    Substance History      OB/GYN          Other                      Anesthesia Plan    ASA 3     regional       Anesthetic plan, risks, benefits, and alternatives have been provided, discussed and informed consent has been obtained with: patient.    CODE STATUS:          Needs x 2/done

## 2024-12-11 NOTE — ASU DISCHARGE PLAN (ADULT/PEDIATRIC) - ASU DC SPECIAL INSTRUCTIONSFT
Increase ambulation and utilize incentive spirometry frequently.   Apply ice packs to abdominal wall/incision sites for 20 mins on/20 mins off every 4 hours for the next 24 hours and to shoulders as needed for discomfort.   Continue Bariatric Phase 1 Diet and follow nutritional guidelines as instructed.  Take liquid/dissolveable Tylenol 1000mg every 6 hours for at least 3 days and no more than 5 days. Take oxycodone as needed for breakthrough pain. If taking narcotic pain medications, may take Colace/OTC stool softener (whole) as directed to prevent constipation.

## 2024-12-11 NOTE — CONSULT NOTE ADULT - TIME BILLING
Extensive chart review took place  Coordinated care with respiratory therapy regarding the CPAP management for overnight tonight  Coordinated with the bariatric surgery team regarding the medication reconciliation  Spoke with the floor staff regarding dispo planning, pain management  Monitor fluid status closely  Majority of encounter was spent providing counseling today

## 2024-12-11 NOTE — BRIEF OPERATIVE NOTE - COMMENTS
IChapo PA-C provided direct first assist support to the surgeon during this surgical procedure. My involvement included positioning, prepping and draping the patient prior to surgery, ensuring clear visibility and exposure for the surgeon by using instruments such as retractors, suction and sponges, stapling tissues and vessels, retrieving specimens from the operative field, closing surgical incisions, and dressing wounds.  As well as other tasks as directed by the surgeon.

## 2024-12-11 NOTE — DISCHARGE NOTE PROVIDER - NSDCFUSCHEDAPPT_GEN_ALL_CORE_FT
Saad Aparicio  [facility] Physician [facility]  BARIATRIC [address]  Scheduled Appointment: 12/19/2024    Saad Aparicio  [facility] Physician [facility]  [address]ATRIC [address]  Scheduled Appointment: 01/16/2025    [facility]  WEIGHTMGMT [address]  Scheduled Appointment: 01/21/2025

## 2024-12-11 NOTE — ASU PREOP CHECKLIST - PATIENT'S PERSONAL PROPERTY REMOVED
Denies 1 ipad/1 wallet -->pt gave to his mother/1 cpap -->pacu/ 1 cellphone -->FATEMEH augustin 1 wallet -->pt gave to his mother/1 cpap -->pacu/ 1 cellphone -->HR locker/ 1 ipad ---> security

## 2024-12-11 NOTE — CONSULT NOTE ADULT - SUBJECTIVE AND OBJECTIVE BOX
CHIEF COMPLAINT/ REASON FOR VISIT  .. Patient was seen to address the  issue listed under PROBLEM LIST which is located toward bottom of this note     DEBORAH LEON SDA1 01    Allergies    No Known Allergies    Intolerances        PAST MEDICAL & SURGICAL HISTORY:  Obstructive sleep apnea      Obesity      Cardiac hypertrophy      Anxiety      History of endoscopy      History of cardiac catheterization          FAMILY HISTORY:  Family history of stroke (Father)    Family history of multiple sclerosis (Mother)        Home Medications:  aspirin 81 mg oral tablet: orally once a day (11 Dec 2024 06:25)  atorvastatin 10 mg oral tablet: 1 tab(s) orally once a day (at bedtime) (11 Dec 2024 06:25)  carvedilol 3.125 mg oral tablet: 1 tab(s) orally 2 times a day (11 Dec 2024 06:25)  Entresto 24 mg-26 mg oral tablet: 1 tab(s) orally 2 times a day (11 Dec 2024 06:25)  Lexapro 10 mg oral tablet: 1.5 tab(s) orally once a day (11 Dec 2024 06:25)      MEDICATIONS  (STANDING):  lactated ringers. 1000 milliLiter(s) (75 mL/Hr) IV Continuous <Continuous>    MEDICATIONS  (PRN):  HYDROmorphone  Injectable 0.5 milliGRAM(s) IV Push every 10 minutes PRN Severe Pain (7 - 10)  oxyCODONE    IR 5 milliGRAM(s) Oral once PRN Moderate Pain (4 - 6)              Vital Signs Last 24 Hrs  T(C): 36.7 (11 Dec 2024 09:46), Max: 36.7 (11 Dec 2024 09:46)  T(F): 98.1 (11 Dec 2024 09:46), Max: 98.1 (11 Dec 2024 09:46)  HR: 70 (11 Dec 2024 10:01) (62 - 75)  BP: 134/83 (11 Dec 2024 10:01) (94/43 - 141/84)  BP(mean): --  RR: 15 (11 Dec 2024 10:01) (14 - 16)  SpO2: 100% (11 Dec 2024 10:01) (98% - 100%)    Parameters below as of 11 Dec 2024 10:01  Patient On (Oxygen Delivery Method): mask, nonrebreather  O2 Flow (L/min): 10        12-10-24 @ 07:01  -  12-11-24 @ 07:00  --------------------------------------------------------  IN: 1145 mL / OUT: 0 mL / NET: 1145 mL              LABS:                    WBC:      MICROBIOLOGY:  RECENT CULTURES:                  Sodium:          Hemoglobin:      Platelets:             RADIOLOGY & ADDITIONAL STUDIES:      MICROBIOLOGY:  RECENT CULTURES:          
Name: DEBORAH FOFANA  MRN: [id number]  LOCATION: [place] [address]    ----  Patient is a 39y old  Male who presents with a chief complaint of morbid obesity (11 Dec 2024 09:58)     ----  INTERVAL HPI/OVERNIGHT EVENTS:   39-year-old male with past medical history hypertrophic cardiomyopathy with ejection fraction of 45%, obstructive sleep apnea on CPAP, morbid obesity, anxiety presents for routinely scheduled Laparoscopic sleeve gastrectomy with Dr. Aparicio on 12/11/24  Pt seen and evaluated at the bedside on postop day 0 in his room on the surgical unit.  The patient reports mid epigastric abdominal discomfort.  Pain is moderate in severity.  At times will radiate up into his left upper back.  Pain is worse with palpation.  Described as crampy, episodic and dull.  He has no other active complaints.  No nausea or vomiting.  He is belching which he was counseled was typical on postop day 0.  He has no fever or chills.  He has no shortness of breath or chest pain.  No other active complaints at this time.  He does not know his CPAP settings    ----  PAST MEDICAL & SURGICAL HISTORY:  Obstructive sleep apnea      Obesity      Cardiac hypertrophy      Anxiety      History of endoscopy      History of cardiac catheterization          ----  Home Medications:  aspirin 81 mg oral tablet: orally once a day (11 Dec 2024 06:25)  atorvastatin 10 mg oral tablet: 1 tab(s) orally once a day (at bedtime) (11 Dec 2024 06:25)  carvedilol 3.125 mg oral tablet: 1 tab(s) orally 2 times a day (11 Dec 2024 06:25)  Entresto 24 mg-26 mg oral tablet: 1 tab(s) orally 2 times a day (11 Dec 2024 06:25)  Lexapro 10 mg oral tablet: 1.5 tab(s) orally once a day (11 Dec 2024 06:25)      ----  FAMILY HISTORY:  Family history of stroke (Father)    Family history of multiple sclerosis (Mother)        ----  Allergies    No Known Allergies    Intolerances        ----  Social History:  - etoh: Reports social alcohol use with no history of dependence or abuse  - tobacco: Denies  - recreational drug use: Denies  - occupation: Works for [employer]    - ambulation status: Independent    ----  REVIEW OF SYSTEMS:  CONSTITUTIONAL: denies fever, chills, fatigue, weakness  HEENT: denies blurred vision, sore throat  SKIN: denies new lesions, rash  CARDIOVASCULAR: as per HPI  RESPIRATORY: as per HPI  GASTROINTESTINAL: as per HPI  GENITOURINARY: denies dysuria, discharge  NEUROLOGICAL: denies numbness, headache, focal weakness  MUSCULOSKELETAL: denies new joint pain, muscle aches  HEMATOLOGIC: denies gross bleeding, bruising    ----  PHYSICAL EXAM:  T(C): 36.4 (12-11-24 @ 13:47), Max: 36.7 (12-11-24 @ 09:46)  HR: 72 (12-11-24 @ 13:47) (56 - 75)  BP: 126/81 (12-11-24 @ 13:47) (94/43 - 150/87)  RR: 20 (12-11-24 @ 13:47) (12 - 20)  SpO2: 92% (12-11-24 @ 13:47) (92% - 100%)  Wt(kg): --  GENERAL: patient appears comfortable, nontoxic, no distress  EYES: sclera clear, no exudates  ENMT: oropharynx clear without erythema, dry mucous membranes  NECK: supple, soft, no thyromegaly noted  LUNGS: reduced air entry bilaterally, no wheezing, no accessory muscle use  HEART: S1/S2, regular rate, no murmurs, distant heart sounds  GASTROINTESTINAL: abdomen is softly distended, hypoactive bowel sounds  INTEGUMENT: skin is warm, no diaphoresis  MUSCULOSKELETAL: no clubbing or cyanosis, no obvious deformity  NEUROLOGIC: awake, alert, good muscle tone in 4 extremities, no obvious sensory deficits     ----  I&O's Summary    10 Dec 2024 07:01  -  11 Dec 2024 07:00  --------------------------------------------------------  IN: 1145 mL / OUT: 0 mL / NET: 1145 mL    11 Dec 2024 07:01  -  11 Dec 2024 16:29  --------------------------------------------------------  IN: 250 mL / OUT: 950 mL / NET: -700 mL      ----  Personally reviewed:  Vital sign trends: Afebrile, heart rate stable, BP soft but stable, maintaining O2 sats on room air  Consultant recommendations: Spoke with the bariatric surgery team regarding the treatment plan, medication regimen    ----  PAIN MANAGEMENT:  -acetaminophen IVPB  -HYDROmorphone  -ibuprofen IVPB    GASTROINTESTINAL:  -hyoscyamine SL  -ondansetron  -pantoprazole  -simethicone    FLUID and ELECTROLYTES:  -lactated ringers

## 2024-12-11 NOTE — ASU DISCHARGE PLAN (ADULT/PEDIATRIC) - CARE PROVIDER_API CALL
Saad Aparicio  Surgery  [address] [place] [address]  Phone: [phone]  Fax: [phone]  Follow Up Time:

## 2024-12-11 NOTE — DISCHARGE NOTE PROVIDER - NSDCMRMEDTOKEN_GEN_ALL_CORE_FT
aspirin 81 mg oral tablet: orally once a day  atorvastatin 10 mg oral tablet: 1 tab(s) orally once a day (at bedtime)  carvedilol 3.125 mg oral tablet: 1 tab(s) orally 2 times a day  Entresto 24 mg-26 mg oral tablet: 1 tab(s) orally 2 times a day  Lexapro 10 mg oral tablet: 1.5 tab(s) orally once a day   acetaminophen 500 mg oral powder: 2 packet(s) orally every 8 hours Take regularly for 3-5 days  aspirin 81 mg oral tablet: orally once a day  atorvastatin 10 mg oral tablet: 1 tab(s) orally once a day (at bedtime)  carvedilol 3.125 mg oral tablet: 1 tab(s) orally 2 times a day  Entresto 24 mg-26 mg oral tablet: 1 tab(s) orally 2 times a day  Lexapro 10 mg oral tablet: 1.5 tab(s) orally once a day  omeprazole 40 mg oral delayed release capsule: 1 cap(s) orally once a day  ondansetron 4 mg oral tablet, disintegratin tab(s) orally every 6 hours as needed for  nausea  oxyCODONE 5 mg oral tablet: 1 tab(s) orally every 6 hours as needed for  severe pain  simethicone 125 mg oral tablet, chewable: 1 tab(s) chewed every 8 hours as needed for  gas pain

## 2024-12-11 NOTE — DISCHARGE NOTE PROVIDER - HOSPITAL COURSE
HPI:  40 yo male is scheduled for laparoscopic sleeve gastrectomy with upper endoscopy on 12/11/2024.    Hospital course:         HPI:  40 yo male is scheduled for laparoscopic sleeve gastrectomy with upper endoscopy on 12/11/2024.    Hospital course: Taken to the OR on 12/11 by Dr Aparicio and is S/P Sleeve gastrectomy. Pt tolerated well. Was recovered in PACU and transferred to floor for overnight monitoring. As per protocol, diet advanced and tolerated. Pain was controlled and pt was ambulating independently  He is stable for discharge home on POD#1

## 2024-12-11 NOTE — DISCHARGE NOTE PROVIDER - ATTENDING ATTESTATION STATEMENT
Thank you for choosing us for your care. I think an in-clinic visit would be best next steps based on your symptoms. Please schedule a clinic appointment, either video or inperson; you won t be charged for this eVisit.      You can schedule an appointment right here in [facility], or call [phone]     I have personally seen and examined the patient. I have collaborated with and supervised the

## 2024-12-11 NOTE — DISCHARGE NOTE PROVIDER - NSDCCPCAREPLAN_GEN_ALL_CORE_FT
PRINCIPAL DISCHARGE DIAGNOSIS  Diagnosis: Morbid obesity due to excess calories  Assessment and Plan of Treatment:

## 2024-12-11 NOTE — BRIEF OPERATIVE NOTE - NSICDXBRIEFPROCEDURE_GEN_ALL_CORE_FT
PROCEDURES:  Laparoscopic sleeve gastrectomy 11-Dec-2024 09:51:27  Chapo Francois   PROCEDURES:  EGD, intraoperative 11-Dec-2024 10:04:01  Chapo Francois  Laparoscopic sleeve gastrectomy 11-Dec-2024 09:51:27  Chapo Francois

## 2024-12-11 NOTE — CONSULT NOTE ADULT - ASSESSMENT
Initial evaluation/Pulmonary Critical Care consultation requested by DR CAVAZOS  on  12/11/2024 from Dr Sal Cheek    Patient examined chart reviewed    HOSPITAL ADMISSION   PATIENT CAME  FROM (if information available)      REASON FOR VISIT  .. Management of problems listed below        REVIEW OF SYMPTOMS   Able to give ROS  Yes     RELIABILITY +/-   CONSTITUTIONAL Weakness Yes    ENDOCRINE  No heat or cold intolerance    ALLERGY No hives  Sore throat No stridor  RESP Shortness of breath YES   NEURO New weakness No   CARDIAC   Palpitations No         PHYSICAL EXAM    HEENT Unremarkable  atraumatic   RESP Fair air entry  Harsh breath sound   CARDIAC S1 S2 No S3     NO JVD    ABDOMEN No hepatosplenomegaly   PEDAL EDEMA present No calf tenderness  NO rash       XXXXXXXXXXXXXXXXXXXXXXXXXXXXX  BEST PRACTICE ISSUES.  . HOB ELEVATN.    .... Yes  . DIET.   .... 12/11/2024 npo   . IV FLUIDS.  .... 12/11/2024 lr 85   . DVT PPLX.    .... 12/12 lvnx 40   . STRESS ULCER PPLX.   ....  12/11/2024 protonix 40   . INFECTION PPLX.   ....     XXXXXXXXXXXXXXXXXXXXXXXXX  GENERAL DATA .   GOC.    ..    ICU STAY.    .. no   COVID.   ..   ALLGY.   ..     nka  WT.   ..  12/11/2024 159  BMI.  .. bmi 52   ISOLATION.        XXXXXXXXXXXXXXXXXXXXXXX  VITALS/GAS EXCHANGE/DRIPS  ABGS.   .  VS/ PO/IO/ VENT/ DRIPS.   12/11/2024 afeb 63 100/55   12/11/2024 ra 92%       CHIEF COMPLAINT.   . 12/11/2024 Postop     OVERALL PRESENTATION.  . 39 m with morbid obesity was admitted 12/11/2024 for bariatric surgery and had Laparoscopic sleeve gastrectomy     . 12/11/2024 Pulm consulted for postop followup     PMH.  PAST HOSPITAL STAYS .   .  HOME MEDS.    XXXXXXXXXXXXXXXXXXXXXXXXXXXXXXXXXXX  PROBLEM ASSESSMENT RECOMMENDATIONS.  RESP.   . AIRWAYS   .... No HO asthma   .... PFTS 7/5/2024  92% FEV1 379 94% FEV1/FVC 84% TLC 83% RV/TLC 23% ERV 69% % DLCO 90%     . REGINO   .... Sleep study 6/20/2018 AHI 72   .... Pt has brought his own CPAP machine does not know settings   ;;;; 12/11/2024 May use own CPAP     INFECTION.  .... w 11/27 w 6.1   .... No active infection    CARDIAC.  . CAD  .... Cath 11/14/2024 L main ok Mid LAD 40% stenosis left dominant circulation    .... stable     . R CHF  .... tte 9/11/2024 EF 55% TAPSE 16 trv 2    .... Under control    HEMAT.  .... Hb 11/27 Hb 15.5   .... Plt 11/27 plt 247   .... monitor      GI.  RENAL.  .... Na 11/27 Na 143   .... K 11/27 K 5   .... CO2 11/27 co2 33   .... AG 11/27/2024 AG 5   .... Alb 11/27/2024 alb 3.5   .... Cr 11/27/2024 Cr 1.1   .... monitor     ENDO.  . OBESITY   .... Inc sekou     NEURO.  DISCUSSIONS.    XXXXXXXXXXXXXXXXXXXXXX   SUMMARY  CC.   . 12/11/2024 Postop bariatric surgery    PROBLEMS .  . POSTOP BARIATRIC SURGERY   ....   Laparoscopic sleeve gastrectomy   done 12/11/2024   . MORBID OBESITY  . OBSTRUCTIVE SLEEP APNEA   .... Sleep study 6/20/2018 AHI 72   . CAD  .... Cath 11/14/2024 L main ok Mid LAD 40% stenosis left dominant circulation    . R CHF  .... tte 9/11/2024 EF 55% TAPSE 16 trv 2        . PROCEDURE   . 12/11/2024 Laparoscopic sleeve gastrectomy      PMH.  Anxiety   Obesity   Obstructive sleep apnea.   . CAD  .... Cath 11/14/2024 L main ok Mid LAD 40% stenosis left dominant circulation    . R CHF  .... tte 9/11/2024 EF 55% TAPSE 16 trv 2        TIME SPENT.  . Over 55  minutes aggregate care time spent on encounter; activities included   direct patient care, counseling and/or coordinating care reviewing notes, lab data/ imaging , discussion with multidisciplinary team/ patient  /family and explaining in sshadetail risks, benefits, alternatives  of the recommendations     BRIGIDO CABRERA 39 m 12/11/2024 1986   
39-year-old male with past medical history hypertrophic cardiomyopathy with ejection fraction of 45%, obstructive sleep apnea on CPAP, morbid obesity, anxiety presents for routinely scheduled Laparoscopic sleeve gastrectomy with Dr. Aparicio on 12/11/24    #morbid obesity  - NPO, IVF  - remote tele x24hrs, check daily electrolyte panel while admitted  - wean O2 as tolerated  - monitor for orthostasis  - supportive care: zofran, levsin, simethicone, gi ppx (pantoprazole)  - pain control: tylenol scheduled x24hrs, iv ibuprofen (prn moderate), dilaudid on POD0 (prn severe) then switch to oxycodone on POD1 (prn severe)  - encourage the use of non-narcotic analgesia  - vte ppx: scd's, add lovenox POD1  - nutrition consult  - encourage ambulation  - emotional support and counseling provided to patient  - Hold aspirin for now    # Obstructive sleep apnea  - Reviewed the patient's home equipment with the respiratory team  - Unclear settings on the current machine  - Will start with 8 mm of pressure for tonight and adjust as needed per the discretion of the respiratory therapy staff    # Hypertrophic cardiomyopathy  # Chronic heart failure with reduced ejection fraction  - Continue Entresto, with holding parameter  - Continue Coreg, with holding parameter  - spoke with the clinical pharmacy staff  - Hold aspirin for now    # Hyperlipidemia  - Resume statin tomorrow, okay to hold p.o. meds the night    # Anxiety  - Resume Lexapro tomorrow    #VTE ppx: SCD's for today, lovenox for tomorrow  #GI ppx: continue PPI  #Diet: Diet, NPO: Except Medications With Ice Chips/Sips of Water (12-11-24 @ 10:45) [Active]  #Activity: Activity - Ambulate as Tolerated:   Time/Priority:  Routine   Additional Instructions:  Every four(4) hours (12-11-24 @ 10:45) [Active]  #ACP: full code  #Dispo: further plans pending clinical course    Thank you for allowing us to participate in the care of this patient. Our team will continue to follow along with you. Further recommendations to follow pending the clinical course.

## 2024-12-11 NOTE — PROGRESS NOTE ADULT - SUBJECTIVE AND OBJECTIVE BOX
BARIATRIC PA POST-OP NOTE    S/P laparoscopic sleeve gastrectomy    SUBJECTIVE:  Patient seen and examined at bedside, alert, resting comfortably, in no acute distress.   Patient reports pain is well-controlled with pain medications.  Patient reports no nausea, voiding independently, ambulating.  Patient denies vomiting, diarrhea, shortness of breath, dizziness.    OBJECTIVE:  Vital signs stable, afebrile.  I/Os: lactated ringers @150 ml/h    Physical Exam:  Gen: alert, NAD  Lungs: unlabored and equal chest expansion  CVS: pulse regular  ABD: soft, nondistended, appropriate incisional tenderness, incisions - dressings/steristrips clean, dry, intact. Steri strips present slight blood streaking  EXT: no edema or calf tenderness bilaterally.     A/P: POD#0, s/p laparoscopic sleeve gastrectomy is doing well, he is stable, starting ice chips    - Continue current care.  - Diet - NPO with sips and ice chips.  - IVF: LR @ 150 ml/h.  - GI prophylaxis: IV pantoprazole QD.  - DVT prophylaxis: SCDs, enoxaparin QD.  - Analgesia: IV acetaminophen, IV ibuprofen prn, IV Dilaudid prn   - Antiemetics: ondansetron, Levsin prn.  - Encourage OOB/ambulation.  - Encourage incentive spirometry/cough/deep breathing.  - AM labs ordered.     BARIATRIC PA POST-OP NOTE    S/P laparoscopic sleeve gastrectomy    SUBJECTIVE:  Patient seen and examined at bedside, alert, resting comfortably, in no acute distress.   Patient reports pain is well-controlled with pain medications.  Patient reports no nausea, voiding independently with yellow urine, ambulating.  Patient denies vomiting, diarrhea, shortness of breath, dizziness.    OBJECTIVE:  Vital signs stable, afebrile.   I/Os: lactated ringers @150 ml/h    Physical Exam:  Gen: alert, NAD  Lungs: unlabored and equal chest expansion  CVS: pulse regular  ABD: soft, nondistended, appropriate incisional tenderness, incisions - dressings/steristrips clean, dry, intact. Steri strips present slight blood streaking  EXT: no edema or calf tenderness bilaterally.     A/P: POD#0, s/p laparoscopic sleeve gastrectomy is doing well, he is stable, starting ice chips    - Continue current care.  - Diet - NPO with sips and ice chips.  - IVF: LR @ 150 ml/h.  - GI prophylaxis: IV pantoprazole QD.  - DVT prophylaxis: SCDs, enoxaparin QD.  - Analgesia: IV acetaminophen, IV ibuprofen prn, IV Dilaudid prn   - Antiemetics: ondansetron, Levsin prn.  - Encourage OOB/ambulation.  - Encourage incentive spirometry/cough/deep breathing.  - AM labs ordered.

## 2024-12-12 ENCOUNTER — TRANSCRIPTION ENCOUNTER (OUTPATIENT)
Age: 39
End: 2024-12-12

## 2024-12-12 VITALS
DIASTOLIC BLOOD PRESSURE: 88 MMHG | OXYGEN SATURATION: 97 % | HEART RATE: 85 BPM | RESPIRATION RATE: 18 BRPM | SYSTOLIC BLOOD PRESSURE: 148 MMHG | TEMPERATURE: 98 F

## 2024-12-12 LAB
ANION GAP SERPL CALC-SCNC: 9 MMOL/L — SIGNIFICANT CHANGE UP (ref 5–17)
BASOPHILS # BLD AUTO: 0.01 K/UL — SIGNIFICANT CHANGE UP (ref 0–0.2)
BASOPHILS NFR BLD AUTO: 0.1 % — SIGNIFICANT CHANGE UP (ref 0–2)
BUN SERPL-MCNC: 12 MG/DL — SIGNIFICANT CHANGE UP (ref 7–23)
CALCIUM SERPL-MCNC: 9 MG/DL — SIGNIFICANT CHANGE UP (ref 8.5–10.1)
CHLORIDE SERPL-SCNC: 105 MMOL/L — SIGNIFICANT CHANGE UP (ref 96–108)
CO2 SERPL-SCNC: 24 MMOL/L — SIGNIFICANT CHANGE UP (ref 22–31)
CREAT SERPL-MCNC: 0.93 MG/DL — SIGNIFICANT CHANGE UP (ref 0.5–1.3)
EGFR: 108 ML/MIN/1.73M2 — SIGNIFICANT CHANGE UP
EOSINOPHIL # BLD AUTO: 0 K/UL — SIGNIFICANT CHANGE UP (ref 0–0.5)
EOSINOPHIL NFR BLD AUTO: 0 % — SIGNIFICANT CHANGE UP (ref 0–6)
GLUCOSE SERPL-MCNC: 105 MG/DL — HIGH (ref 70–99)
HCT VFR BLD CALC: 40.6 % — SIGNIFICANT CHANGE UP (ref 39–50)
HGB BLD-MCNC: 14.4 G/DL — SIGNIFICANT CHANGE UP (ref 13–17)
IMM GRANULOCYTES NFR BLD AUTO: 0.3 % — SIGNIFICANT CHANGE UP (ref 0–0.9)
LYMPHOCYTES # BLD AUTO: 1.57 K/UL — SIGNIFICANT CHANGE UP (ref 1–3.3)
LYMPHOCYTES # BLD AUTO: 12.2 % — LOW (ref 13–44)
MCHC RBC-ENTMCNC: 28.7 PG — SIGNIFICANT CHANGE UP (ref 27–34)
MCHC RBC-ENTMCNC: 35.5 G/DL — SIGNIFICANT CHANGE UP (ref 32–36)
MCV RBC AUTO: 81 FL — SIGNIFICANT CHANGE UP (ref 80–100)
MONOCYTES # BLD AUTO: 0.63 K/UL — SIGNIFICANT CHANGE UP (ref 0–0.9)
MONOCYTES NFR BLD AUTO: 4.9 % — SIGNIFICANT CHANGE UP (ref 2–14)
NEUTROPHILS # BLD AUTO: 10.67 K/UL — HIGH (ref 1.8–7.4)
NEUTROPHILS NFR BLD AUTO: 82.5 % — HIGH (ref 43–77)
NRBC # BLD: 0 /100 WBCS — SIGNIFICANT CHANGE UP (ref 0–0)
PLATELET # BLD AUTO: 249 K/UL — SIGNIFICANT CHANGE UP (ref 150–400)
POTASSIUM SERPL-MCNC: 4.3 MMOL/L — SIGNIFICANT CHANGE UP (ref 3.5–5.3)
POTASSIUM SERPL-SCNC: 4.3 MMOL/L — SIGNIFICANT CHANGE UP (ref 3.5–5.3)
RBC # BLD: 5.01 M/UL — SIGNIFICANT CHANGE UP (ref 4.2–5.8)
RBC # FLD: 13.9 % — SIGNIFICANT CHANGE UP (ref 10.3–14.5)
SODIUM SERPL-SCNC: 138 MMOL/L — SIGNIFICANT CHANGE UP (ref 135–145)
SURGICAL PATHOLOGY STUDY: SIGNIFICANT CHANGE UP
WBC # BLD: 12.92 K/UL — HIGH (ref 3.8–10.5)
WBC # FLD AUTO: 12.92 K/UL — HIGH (ref 3.8–10.5)

## 2024-12-12 PROCEDURE — 86900 BLOOD TYPING SEROLOGIC ABO: CPT

## 2024-12-12 PROCEDURE — 94660 CPAP INITIATION&MGMT: CPT

## 2024-12-12 PROCEDURE — 36415 COLL VENOUS BLD VENIPUNCTURE: CPT

## 2024-12-12 PROCEDURE — 85025 COMPLETE CBC W/AUTO DIFF WBC: CPT

## 2024-12-12 PROCEDURE — 94760 N-INVAS EAR/PLS OXIMETRY 1: CPT

## 2024-12-12 PROCEDURE — 80048 BASIC METABOLIC PNL TOTAL CA: CPT

## 2024-12-12 PROCEDURE — 99024 POSTOP FOLLOW-UP VISIT: CPT

## 2024-12-12 PROCEDURE — 86850 RBC ANTIBODY SCREEN: CPT

## 2024-12-12 PROCEDURE — 86901 BLOOD TYPING SEROLOGIC RH(D): CPT

## 2024-12-12 PROCEDURE — 88307 TISSUE EXAM BY PATHOLOGIST: CPT

## 2024-12-12 PROCEDURE — C1889: CPT

## 2024-12-12 PROCEDURE — C9399: CPT

## 2024-12-12 RX ORDER — SIMETHICONE 125 MG
1 CAPSULE ORAL
Qty: 0 | Refills: 0 | DISCHARGE

## 2024-12-12 RX ORDER — ACETAMINOPHEN 500MG 500 MG/1
2 TABLET, COATED ORAL
Qty: 0 | Refills: 0 | DISCHARGE

## 2024-12-12 RX ORDER — ONDANSETRON HYDROCHLORIDE 4 MG/1
1 TABLET, FILM COATED ORAL
Qty: 0 | Refills: 0 | DISCHARGE

## 2024-12-12 RX ORDER — OMEPRAZOLE 20 MG/1
1 CAPSULE, DELAYED RELEASE ORAL
Qty: 0 | Refills: 0 | DISCHARGE

## 2024-12-12 RX ORDER — OXYCODONE HYDROCHLORIDE 30 MG/1
1 TABLET ORAL
Qty: 0 | Refills: 0 | DISCHARGE

## 2024-12-12 RX ADMIN — SACUBITRIL AND VALSARTAN 1 TABLET(S): 24; 26 TABLET, FILM COATED ORAL at 05:11

## 2024-12-12 RX ADMIN — Medication 400 MILLIGRAM(S): at 05:11

## 2024-12-12 RX ADMIN — ACETAMINOPHEN 500MG 400 MILLIGRAM(S): 500 TABLET, COATED ORAL at 05:11

## 2024-12-12 RX ADMIN — ESCITALOPRAM OXALATE 10 MILLIGRAM(S): 10 TABLET, FILM COATED ORAL at 12:31

## 2024-12-12 RX ADMIN — Medication 400 MILLIGRAM(S): at 12:32

## 2024-12-12 RX ADMIN — ACETAMINOPHEN 500MG 400 MILLIGRAM(S): 500 TABLET, COATED ORAL at 08:55

## 2024-12-12 RX ADMIN — Medication 800 MILLIGRAM(S): at 01:53

## 2024-12-12 RX ADMIN — ENOXAPARIN SODIUM 40 MILLIGRAM(S): 30 INJECTION SUBCUTANEOUS at 08:56

## 2024-12-12 RX ADMIN — CARVEDILOL 3.12 MILLIGRAM(S): 25 TABLET, FILM COATED ORAL at 05:11

## 2024-12-12 RX ADMIN — Medication 80 MILLIGRAM(S): at 10:35

## 2024-12-12 RX ADMIN — PANTOPRAZOLE SODIUM 40 MILLIGRAM(S): 40 TABLET, DELAYED RELEASE ORAL at 12:32

## 2024-12-12 RX ADMIN — Medication 800 MILLIGRAM(S): at 13:32

## 2024-12-12 RX ADMIN — ACETAMINOPHEN 500MG 1000 MILLIGRAM(S): 500 TABLET, COATED ORAL at 09:55

## 2024-12-12 RX ADMIN — Medication 400 MILLIGRAM(S): at 00:53

## 2024-12-12 NOTE — PROGRESS NOTE ADULT - SUBJECTIVE AND OBJECTIVE BOX
POD#1 s/p lap gastric sleeve    S: Patient seen and examined while ambulating in hallway.  No overnight events.  Patient reports no new complaints at this time.  Patient denies any fever, chills, chest pain, shortness of breath, nausea, vomiting, or urinary complaints.    MEDICATIONS:  MEDICATIONS  (STANDING):  acetaminophen   IVPB .. 1000 milliGRAM(s) IV Intermittent once  acetaminophen   IVPB .. 1000 milliGRAM(s) IV Intermittent once  acetaminophen   IVPB .. 1000 milliGRAM(s) IV Intermittent once  atorvastatin 10 milliGRAM(s) Oral at bedtime  carvedilol 3.125 milliGRAM(s) Oral every 12 hours  enoxaparin Injectable 40 milliGRAM(s) SubCutaneous every 24 hours  escitalopram 10 milliGRAM(s) Oral daily  ibuprofen IVPB .. 800 milliGRAM(s) IV Intermittent every 6 hours  lactated ringers. 1000 milliLiter(s) (150 mL/Hr) IV Continuous <Continuous>  pantoprazole  Injectable 40 milliGRAM(s) IV Push daily  sacubitril 24 mG/valsartan 26 mG 1 Tablet(s) Oral two times a day    MEDICATIONS  (PRN):  HYDROmorphone  Injectable 0.5 milliGRAM(s) IV Push every 6 hours PRN Severe Pain (7 - 10)  hyoscyamine SL 0.125 milliGRAM(s) SubLingual every 6 hours PRN nausea/vomiting  ondansetron Injectable 4 milliGRAM(s) IV Push every 6 hours PRN Nausea  simethicone 80 milliGRAM(s) Chew every 8 hours PRN Gas      O:  VITAL SIGNS:  Vital Signs Last 24 Hrs  T(C): 36.7 (12 Dec 2024 06:41), Max: 36.7 (11 Dec 2024 09:46)  T(F): 98.1 (12 Dec 2024 06:41), Max: 98.1 (11 Dec 2024 09:46)  HR: 93 (12 Dec 2024 06:41) (56 - 96)  BP: 131/77 (12 Dec 2024 06:41) (121/75 - 150/87)  RR: 18 (12 Dec 2024 06:41) (12 - 20)  SpO2: 98% (12 Dec 2024 06:41) (92% - 100%)    Parameters below as of 12 Dec 2024 06:41  Patient On (Oxygen Delivery Method): room air        PHYSICAL EXAM:  GENERAL: No acute distress, seen ambulating in hallway  HEAD:  Atraumatic, Normocephalic  CHEST/LUNG: Non labored respirations, no accessory muscle use  HEART: Regular rate and rhythm  ABDOMEN: Soft, non-tender, non-distended; surgical incisions c/d/i  EXT: calves non-tender b/l, no edema  NEUROLOGY: A&O x 3, no focal deficits    INTAKE & OUTPUT:  I&O's Summary    11 Dec 2024 07:01  -  12 Dec 2024 07:00  --------------------------------------------------------  IN: 3850 mL / OUT: 1700 mL / NET: 2150 mL      I&O's Detail    11 Dec 2024 07:01  -  12 Dec 2024 07:00  --------------------------------------------------------  IN:    IV PiggyBack: 600 mL    IV PiggyBack: 300 mL    Lactated Ringers: 250 mL    Lactated Ringers: 2550 mL    Oral Fluid: 150 mL  Total IN: 3850 mL    OUT:    Voided (mL): 1700 mL  Total OUT: 1700 mL    Total NET: 2150 mL      LABS:                        14.4   12.92 )-----------( 249      ( 12 Dec 2024 06:45 )             40.6     12-12    138  |  105  |  12  ----------------------------<  105[H]  4.3   |  24  |  0.93    Ca    9.0      12 Dec 2024 06:45      A: 40y/o Male PMHx hypertrophic cardiomyopathy, REGINO with CPAP, morbid obesity, anxiety POD#1 s/p lap sleeve gastrectomy, today with VSSAF, exam reassuring, tolerating sips and chips,, labs reassruing.     P:  - VSSAF, exam and labs reassuring  - Adv diet to john CLD   - On exam:   - Labs   - On CT:  - Continue diet  - Continue antibiotics  - Continue to monitor Is & Os  - DVT prophylaxis with  - Encourage OOBA, IS  - Pain control, supportive care    Case to be discussed with   POD#1 s/p lap gastric sleeve    S: Patient seen and examined while ambulating in hallway.  No overnight events.  Patient reports no new complaints at this time.  Patient denies any fever, chills, chest pain, shortness of breath, nausea, vomiting, or urinary complaints.    MEDICATIONS:  MEDICATIONS  (STANDING):  acetaminophen   IVPB .. 1000 milliGRAM(s) IV Intermittent once  acetaminophen   IVPB .. 1000 milliGRAM(s) IV Intermittent once  acetaminophen   IVPB .. 1000 milliGRAM(s) IV Intermittent once  atorvastatin 10 milliGRAM(s) Oral at bedtime  carvedilol 3.125 milliGRAM(s) Oral every 12 hours  enoxaparin Injectable 40 milliGRAM(s) SubCutaneous every 24 hours  escitalopram 10 milliGRAM(s) Oral daily  ibuprofen IVPB .. 800 milliGRAM(s) IV Intermittent every 6 hours  lactated ringers. 1000 milliLiter(s) (150 mL/Hr) IV Continuous <Continuous>  pantoprazole  Injectable 40 milliGRAM(s) IV Push daily  sacubitril 24 mG/valsartan 26 mG 1 Tablet(s) Oral two times a day    MEDICATIONS  (PRN):  HYDROmorphone  Injectable 0.5 milliGRAM(s) IV Push every 6 hours PRN Severe Pain (7 - 10)  hyoscyamine SL 0.125 milliGRAM(s) SubLingual every 6 hours PRN nausea/vomiting  ondansetron Injectable 4 milliGRAM(s) IV Push every 6 hours PRN Nausea  simethicone 80 milliGRAM(s) Chew every 8 hours PRN Gas      O:  VITAL SIGNS:  Vital Signs Last 24 Hrs  T(C): 36.7 (12 Dec 2024 06:41), Max: 36.7 (11 Dec 2024 09:46)  T(F): 98.1 (12 Dec 2024 06:41), Max: 98.1 (11 Dec 2024 09:46)  HR: 93 (12 Dec 2024 06:41) (56 - 96)  BP: 131/77 (12 Dec 2024 06:41) (121/75 - 150/87)  RR: 18 (12 Dec 2024 06:41) (12 - 20)  SpO2: 98% (12 Dec 2024 06:41) (92% - 100%)    Parameters below as of 12 Dec 2024 06:41  Patient On (Oxygen Delivery Method): room air        PHYSICAL EXAM:  GENERAL: No acute distress, seen ambulating in hallway  HEAD:  Atraumatic, Normocephalic  CHEST/LUNG: Non labored respirations, no accessory muscle use  HEART: Regular rate and rhythm  ABDOMEN: Soft, non-tender, non-distended; surgical incisions c/d/i  EXT: calves non-tender b/l, no edema  NEUROLOGY: A&O x 3, no focal deficits    INTAKE & OUTPUT:  I&O's Summary    11 Dec 2024 07:01  -  12 Dec 2024 07:00  --------------------------------------------------------  IN: 3850 mL / OUT: 1700 mL / NET: 2150 mL      I&O's Detail    11 Dec 2024 07:01  -  12 Dec 2024 07:00  --------------------------------------------------------  IN:    IV PiggyBack: 600 mL    IV PiggyBack: 300 mL    Lactated Ringers: 250 mL    Lactated Ringers: 2550 mL    Oral Fluid: 150 mL  Total IN: 3850 mL    OUT:    Voided (mL): 1700 mL  Total OUT: 1700 mL    Total NET: 2150 mL      LABS:                        14.4   12.92 )-----------( 249      ( 12 Dec 2024 06:45 )             40.6     12-12    138  |  105  |  12  ----------------------------<  105[H]  4.3   |  24  |  0.93    Ca    9.0      12 Dec 2024 06:45      A: 40y/o Male PMHx hypertrophic cardiomyopathy, REGINO with CPAP, morbid obesity, anxiety POD#1 s/p lap sleeve gastrectomy, today with VSSAF, exam reassuring, tolerating sips and chips,, labs reassruing.     P:  - VSSAF, patient doing well post-op, ambulating and voiding  - Adv to john CLD   - Pain/nausea control, PPI, continue home meds  - Incentive spirometer/OOB/Ambulate  - Anticoagulation: Lovenox   - IVF 150cc/hr  - AM labs    Discussed with Dr. Aparicio.     [facility] [phone]

## 2024-12-12 NOTE — CHART NOTE - NSCHARTNOTEFT_GEN_A_CORE
Pt seen by a Registered Dietitian prior to surgery for comprehensive nutrition assessment and thorough nutrition education. Nutrition consult in medical record. Pt appears to have good comprehension of diet regimen. Reviewed and reinforced diet regimen with patient. Also, advised and encouraged pt to comply with guidelines for proper hydration, adequate protein intake and vitamin usage. Provided post-op nutrition education material/business card.

## 2024-12-12 NOTE — PROGRESS NOTE ADULT - ASSESSMENT
REASON FOR VISIT  .. Management of problems listed below        REVIEW OF SYMPTOMS   Able to give ROS  Yes     RELIABILITY +/-   CONSTITUTIONAL Weakness Yes    ENDOCRINE  No heat or cold intolerance    ALLERGY No hives  Sore throat No stridor  RESP Shortness of breath YES   NEURO New weakness No   CARDIAC   Palpitations No         PHYSICAL EXAM    HEENT Unremarkable  atraumatic   RESP Fair air entry  Harsh breath sound   CARDIAC S1 S2 No S3     NO JVD    ABDOMEN No hepatosplenomegaly   PEDAL EDEMA present No calf tenderness  NO rash       XXXX[id number]  BEST PRACTICE ISSUES.  . HOB ELEVATN.    .... Yes  . DIET.   .... 12/11/2024 npo   . IV FLUIDS.  .... 12/11/2024 lr 85   . DVT PPLX.    .... 12/12 lvnx 40   . STRESS ULCER PPLX.   ....  12/11/2024 protonix 40   . INFECTION PPLX.   ....     XXXXXXXXXXXXXXXXXXXXXXXXX  GENERAL DATA .   GOC.    ..    ICU STAY.    .. no   COVID.   ..   ALLGY.   ..     nka  WT.   ..  12/11/2024 159  BMI.  .. bmi 52     XXXXXXXXXXXXXXXXXXXXXXX  VITALS/GAS EXCHANGE/DRIPS  ABGS.   .  VS/ PO/IO/ VENT/ DRIPS.  12/12/2024 afeb 93 140/80   12/12/2024 ra 94%     CHIEF COMPLAINT.   . 12/11/2024 Postop     OVERALL PRESENTATION.  . 39 m with morbid obesity was admitted 12/11/2024 for bariatric surgery and had Laparoscopic sleeve gastrectomy     . 12/11/2024 Pulm consulted for postop followup     PMH.  PAST HOSPITAL STAYS .   .  HOME MEDS.    XXXXXXXXXXXXXXXXXXXXXXXXXXXXXXXXXXX  PROBLEM ASSESSMENT RECOMMENDATIONS.  RESP.   . AIRWAYS   .... No HO asthma   .... PFTS 7/5/2024  92% FEV1 379 94% FEV1/FVC 84% TLC 83% RV/TLC 23% ERV 69% % DLCO 90%     . REGINO   .... Sleep study 6/20/2018 AHI 72   .... Pt has brought his own CPAP machine does not know settings   .... 12/11/2024 May use own CPAP   .... 12/12/2024 His CPAP was not functioning so empiric cpap was orderd and he did well overnight     INFECTION.  .... w 11/27 w 6.1   .... No active infection    CARDIAC.  . CAD  .... Cath 11/14/2024 L main ok Mid LAD 40% stenosis left dominant circulation    .... stable     . R CHF  .... tte 9/11/2024 EF 55% TAPSE 16 trv 2    .... Under control    HEMAT.  .... Hb 11/27 Hb 15.5   .... Plt 11/27 plt 247   .... monitor      GI.  RENAL.  .... Na 11/27 Na 143   .... K 11/27 K 5   .... CO2 11/27 co2 33   .... AG 11/27/2024 AG 5   .... Alb 11/27/2024 alb 3.5   .... Cr 11/27/2024 Cr 1.1   .... monitor     ENDO.  . OBESITY   .... Inc sekou     NEURO.  DISCUSSIONS.    XXXXXXXXXXXXXXXXXXXXXX   SUMMARY  CC.   . 12/11/2024 Postop bariatric surgery    PROBLEMS .  . POSTOP BARIATRIC SURGERY   ....   Laparoscopic sleeve gastrectomy   done 12/11/2024   . MORBID OBESITY  . OBSTRUCTIVE SLEEP APNEA   .... Sleep study 6/20/2018 AHI 72   . CAD  .... Cath 11/14/2024 L main ok Mid LAD 40% stenosis left dominant circulation    . R CHF  .... tte 9/11/2024 EF 55% TAPSE 16 trv 2        . PROCEDURE   . 12/11/2024 Laparoscopic sleeve gastrectomy      PMH.  Anxiety   Obesity   Obstructive sleep apnea.   . CAD  .... Cath 11/14/2024 L main ok Mid LAD 40% stenosis left dominant circulation    . R CHF  .... tte 9/11/2024 EF 55% TAPSE 16 trv 2        TIME SPENT.  . Over 36 minutes aggregate care time spent on encounter; activities included   direct patient care, counseling and/or coordinating care reviewing notes, lab data/ imaging , discussion with multidisciplinary team/ patient  /family and explaining in detail risks, benefits, alternatives  of the recommendations     BRIGIDO CABRERA 39 m 12/11/2024 1985

## 2024-12-12 NOTE — DISCHARGE NOTE NURSING/CASE MANAGEMENT/SOCIAL WORK - PATIENT PORTAL LINK FT
You can access the FollowMyHealth Patient Portal offered by [facility] by registering at the following website: [email or web address]. By joining [facility]’s FollowMyHealth portal, you will also be able to view your health information using other applications (apps) compatible with our system.

## 2024-12-12 NOTE — PHARMACOTHERAPY INTERVENTION NOTE - COMMENTS
Meds to beds requested by provider.    The following prescriptions were filled at [facility]:  Oxycodone, omeprazole, ondansetron, acetaminophen, Bariatric Fusion vitamins, simethicone    Patient counseled by pharmacist on indication, directions, and side effects.  Patient verbalized understanding and had no further questions.    Counseling materials provided/counseling aids used: Bariatric Surgery 1 Pager, Sleeve Gastrectomy or Gastric Bypass and Your Medication  Time spent Counseling: 15 minutes

## 2024-12-12 NOTE — PROGRESS NOTE ADULT - NS ATTEND AMEND GEN_ALL_CORE FT
LMOR to call Raysa VILLATORO   Patient seen and examined this morning.  Found ambulating the hallways in no acute distress.  Postoperative day #1 status post laparoscopic sleeve gastrectomy.  Vital signs stable.  Labs appropriate.  Patient complaining only of abdominal discomfort consistent with postoperative timeframe and resolution of pneumoperitoneum.  He has tolerated ice chips and liquids at the time of our encounter.  Throughout the day I am told he tolerated appropriate volume of bariatric stage I clear liquid diet.    Patient to be discharged home this afternoon  Postoperative instructions have been provided including avoidance of heavy lifting and strenuous activities in excess of 20-25 pounds for duration of 4-6 weeks. The patient may shower keeping the incisions clean, dry, covered as needed.    Patient scheduled for outpatient follow-up with me in 1 week

## 2024-12-12 NOTE — DISCHARGE NOTE NURSING/CASE MANAGEMENT/SOCIAL WORK - FINANCIAL ASSISTANCE
[facility] provides services at a reduced cost to those who are determined to be eligible through [facility]’s financial assistance program. Information regarding [facility]’s financial assistance program can be found by going to [email or web address] or by calling [phone].

## 2024-12-12 NOTE — PROGRESS NOTE ADULT - SUBJECTIVE AND OBJECTIVE BOX
CHIEF COMPLAINT/ REASON FOR VISIT  .. Patient was seen to address the  issue listed under PROBLEM LIST which is located toward bottom of this note     DEBORAH FOFANA    PLV 2NOR 232 D1    Allergies    No Known Allergies    Intolerances        PAST MEDICAL & SURGICAL HISTORY:  Obstructive sleep apnea      Obesity      Cardiac hypertrophy      Anxiety      History of endoscopy      History of cardiac catheterization          FAMILY HISTORY:  Family history of stroke (Father)    Family history of multiple sclerosis (Mother)        Home Medications:  aspirin 81 mg oral tablet: orally once a day (11 Dec 2024 06:25)  atorvastatin 10 mg oral tablet: 1 tab(s) orally once a day (at bedtime) (11 Dec 2024 06:25)  carvedilol 3.125 mg oral tablet: 1 tab(s) orally 2 times a day (11 Dec 2024 06:25)  Entresto 24 mg-26 mg oral tablet: 1 tab(s) orally 2 times a day (11 Dec 2024 06:25)  Lexapro 10 mg oral tablet: 1.5 tab(s) orally once a day (11 Dec 2024 06:25)      MEDICATIONS  (STANDING):  acetaminophen   IVPB .. 1000 milliGRAM(s) IV Intermittent once  acetaminophen   IVPB .. 1000 milliGRAM(s) IV Intermittent once  atorvastatin 10 milliGRAM(s) Oral at bedtime  carvedilol 3.125 milliGRAM(s) Oral every 12 hours  enoxaparin Injectable 40 milliGRAM(s) SubCutaneous every 24 hours  escitalopram 10 milliGRAM(s) Oral daily  ibuprofen IVPB .. 800 milliGRAM(s) IV Intermittent every 6 hours  lactated ringers. 1000 milliLiter(s) (150 mL/Hr) IV Continuous <Continuous>  pantoprazole  Injectable 40 milliGRAM(s) IV Push daily  sacubitril 24 mG/valsartan 26 mG 1 Tablet(s) Oral two times a day    MEDICATIONS  (PRN):  HYDROmorphone  Injectable 0.5 milliGRAM(s) IV Push every 6 hours PRN Severe Pain (7 - 10)  hyoscyamine SL 0.125 milliGRAM(s) SubLingual every 6 hours PRN nausea/vomiting  ondansetron Injectable 4 milliGRAM(s) IV Push every 6 hours PRN Nausea  simethicone 80 milliGRAM(s) Chew every 8 hours PRN Gas      Diet, Clear Liquid:   Bariatric Clear Liquid (BARICLLIQ) (12-12-24 @ 08:14) [Active]          Vital Signs Last 24 Hrs  T(C): 36.7 (12 Dec 2024 06:41), Max: 36.7 (11 Dec 2024 09:46)  T(F): 98.1 (12 Dec 2024 06:41), Max: 98.1 (11 Dec 2024 09:46)  HR: 93 (12 Dec 2024 06:41) (56 - 96)  BP: 131/77 (12 Dec 2024 06:41) (121/75 - 150/87)  BP(mean): --  RR: 18 (12 Dec 2024 06:41) (12 - 20)  SpO2: 98% (12 Dec 2024 06:41) (92% - 100%)    Parameters below as of 12 Dec 2024 06:41  Patient On (Oxygen Delivery Method): room air          12-11-24 @ 07:01  -  12-12-24 @ 07:00  --------------------------------------------------------  IN: 3850 mL / OUT: 1700 mL / NET: 2150 mL              LABS:                        14.4   12.92 )-----------( 249      ( 12 Dec 2024 06:45 )             40.6     12-12    138  |  105  |  12  ----------------------------<  105[H]  4.3   |  24  |  0.93    Ca    9.0      12 Dec 2024 06:45        Urinalysis Basic - ( 12 Dec 2024 06:45 )    Color: x / Appearance: x / SG: x / pH: x  Gluc: 105 mg/dL / Ketone: x  / Bili: x / Urobili: x   Blood: x / Protein: x / Nitrite: x   Leuk Esterase: x / RBC: x / WBC x   Sq Epi: x / Non Sq Epi: x / Bacteria: x            WBC:  WBC Count: 12.92 K/uL (12-12 @ 06:45)      MICROBIOLOGY:  RECENT CULTURES:                  Sodium:  Sodium: 138 mmol/L (12-12 @ 06:45)      0.93 mg/dL 12-12 @ 06:45      Hemoglobin:  Hemoglobin: 14.4 g/dL (12-12 @ 06:45)      Platelets: Platelet Count - Automated: 249 K/uL (12-12 @ 06:45)          Urinalysis Basic - ( 12 Dec 2024 06:45 )    Color: x / Appearance: x / SG: x / pH: x  Gluc: 105 mg/dL / Ketone: x  / Bili: x / Urobili: x   Blood: x / Protein: x / Nitrite: x   Leuk Esterase: x / RBC: x / WBC x   Sq Epi: x / Non Sq Epi: x / Bacteria: x        RADIOLOGY & ADDITIONAL STUDIES:      MICROBIOLOGY:  RECENT CULTURES:

## 2024-12-12 NOTE — SOCIAL WORK PROGRESS NOTE - NSSWPROGRESSNOTE_GEN_ALL_CORE
SW consult reviewed. Pt discussed in rounds and plan for pt to d/c home today, no SW needs noted at this time. SW to follow and remain available for any needs.

## 2024-12-13 ENCOUNTER — NON-APPOINTMENT (OUTPATIENT)
Age: 39
End: 2024-12-13

## 2024-12-13 ENCOUNTER — TRANSCRIPTION ENCOUNTER (OUTPATIENT)
Age: 39
End: 2024-12-13

## 2024-12-19 ENCOUNTER — APPOINTMENT (OUTPATIENT)
Dept: BARIATRICS | Facility: CLINIC | Age: 39
End: 2024-12-19
Payer: COMMERCIAL

## 2024-12-19 VITALS
TEMPERATURE: 97.5 F | DIASTOLIC BLOOD PRESSURE: 78 MMHG | OXYGEN SATURATION: 97 % | SYSTOLIC BLOOD PRESSURE: 118 MMHG | HEART RATE: 69 BPM | BODY MASS INDEX: 47.74 KG/M2 | HEIGHT: 68 IN | WEIGHT: 315 LBS

## 2024-12-19 DIAGNOSIS — R63.4 ABNORMAL WEIGHT LOSS: ICD-10-CM

## 2024-12-19 DIAGNOSIS — E66.01 MORBID (SEVERE) OBESITY DUE TO EXCESS CALORIES: ICD-10-CM

## 2024-12-19 DIAGNOSIS — G47.33 OBSTRUCTIVE SLEEP APNEA (ADULT) (PEDIATRIC): ICD-10-CM

## 2024-12-19 DIAGNOSIS — Z98.84 BARIATRIC SURGERY STATUS: ICD-10-CM

## 2024-12-19 PROCEDURE — 99024 POSTOP FOLLOW-UP VISIT: CPT

## 2024-12-20 PROBLEM — Z98.84 S/P LAPAROSCOPIC SLEEVE GASTRECTOMY: Status: ACTIVE | Noted: 2024-12-20

## 2024-12-20 PROBLEM — R63.4 WEIGHT LOSS: Status: ACTIVE | Noted: 2024-12-20

## 2025-01-09 ENCOUNTER — APPOINTMENT (OUTPATIENT)
Dept: PULMONOLOGY | Facility: CLINIC | Age: 40
End: 2025-01-09
Payer: COMMERCIAL

## 2025-01-09 PROCEDURE — 99213 OFFICE O/P EST LOW 20 MIN: CPT | Mod: 95

## 2025-01-14 ENCOUNTER — APPOINTMENT (OUTPATIENT)
Dept: PULMONOLOGY | Facility: CLINIC | Age: 40
End: 2025-01-14
Payer: COMMERCIAL

## 2025-01-14 PROCEDURE — ZZZZZ: CPT

## 2025-01-16 ENCOUNTER — APPOINTMENT (OUTPATIENT)
Dept: BARIATRICS | Facility: CLINIC | Age: 40
End: 2025-01-16
Payer: COMMERCIAL

## 2025-01-16 VITALS
TEMPERATURE: 97.8 F | BODY MASS INDEX: 47.74 KG/M2 | DIASTOLIC BLOOD PRESSURE: 78 MMHG | WEIGHT: 315 LBS | HEART RATE: 66 BPM | HEIGHT: 68 IN | SYSTOLIC BLOOD PRESSURE: 112 MMHG | OXYGEN SATURATION: 97 %

## 2025-01-16 DIAGNOSIS — R63.4 ABNORMAL WEIGHT LOSS: ICD-10-CM

## 2025-01-16 DIAGNOSIS — G47.33 OBSTRUCTIVE SLEEP APNEA (ADULT) (PEDIATRIC): ICD-10-CM

## 2025-01-16 PROCEDURE — 99024 POSTOP FOLLOW-UP VISIT: CPT

## 2025-01-16 RX ORDER — CARVEDILOL 3.12 MG/1
3.12 TABLET, FILM COATED ORAL TWICE DAILY
Refills: 0 | Status: ACTIVE | COMMUNITY

## 2025-01-16 RX ORDER — ATORVASTATIN CALCIUM 10 MG/1
10 TABLET, FILM COATED ORAL DAILY
Refills: 0 | Status: ACTIVE | COMMUNITY

## 2025-01-22 ENCOUNTER — APPOINTMENT (OUTPATIENT)
Dept: BARIATRICS/WEIGHT MGMT | Facility: CLINIC | Age: 40
End: 2025-01-22
Payer: COMMERCIAL

## 2025-01-22 DIAGNOSIS — Z98.84 BARIATRIC SURGERY STATUS: ICD-10-CM

## 2025-01-22 DIAGNOSIS — E66.01 MORBID (SEVERE) OBESITY DUE TO EXCESS CALORIES: ICD-10-CM

## 2025-01-22 PROCEDURE — 97803 MED NUTRITION INDIV SUBSEQ: CPT | Mod: 95

## 2025-01-23 VITALS — HEIGHT: 68 IN | WEIGHT: 315 LBS | BODY MASS INDEX: 47.74 KG/M2

## 2025-01-30 ENCOUNTER — APPOINTMENT (OUTPATIENT)
Dept: PULMONOLOGY | Facility: CLINIC | Age: 40
End: 2025-01-30
Payer: COMMERCIAL

## 2025-01-30 VITALS
DIASTOLIC BLOOD PRESSURE: 81 MMHG | OXYGEN SATURATION: 96 % | HEIGHT: 68 IN | BODY MASS INDEX: 47.74 KG/M2 | WEIGHT: 315 LBS | SYSTOLIC BLOOD PRESSURE: 126 MMHG | HEART RATE: 68 BPM

## 2025-01-30 DIAGNOSIS — G47.33 OBSTRUCTIVE SLEEP APNEA (ADULT) (PEDIATRIC): ICD-10-CM

## 2025-01-30 PROCEDURE — G2211 COMPLEX E/M VISIT ADD ON: CPT

## 2025-01-30 PROCEDURE — 99213 OFFICE O/P EST LOW 20 MIN: CPT

## 2025-03-19 ENCOUNTER — APPOINTMENT (OUTPATIENT)
Dept: BARIATRICS/WEIGHT MGMT | Facility: CLINIC | Age: 40
End: 2025-03-19
Payer: COMMERCIAL

## 2025-03-19 DIAGNOSIS — Z98.84 BARIATRIC SURGERY STATUS: ICD-10-CM

## 2025-03-19 DIAGNOSIS — E66.01 MORBID (SEVERE) OBESITY DUE TO EXCESS CALORIES: ICD-10-CM

## 2025-03-19 PROCEDURE — 97803 MED NUTRITION INDIV SUBSEQ: CPT | Mod: 95

## 2025-03-20 ENCOUNTER — APPOINTMENT (OUTPATIENT)
Dept: BARIATRICS | Facility: CLINIC | Age: 40
End: 2025-03-20
Payer: COMMERCIAL

## 2025-03-20 VITALS
BODY MASS INDEX: 45.96 KG/M2 | HEIGHT: 68 IN | WEIGHT: 303.25 LBS | HEART RATE: 61 BPM | OXYGEN SATURATION: 96 % | DIASTOLIC BLOOD PRESSURE: 72 MMHG | TEMPERATURE: 97.2 F | SYSTOLIC BLOOD PRESSURE: 118 MMHG

## 2025-03-20 VITALS — BODY MASS INDEX: 45.47 KG/M2 | HEIGHT: 68 IN | WEIGHT: 300 LBS

## 2025-03-20 DIAGNOSIS — Z13.29 ENCOUNTER FOR SCREENING FOR OTHER SUSPECTED ENDOCRINE DISORDER: ICD-10-CM

## 2025-03-20 DIAGNOSIS — Z13.0 ENCOUNTER FOR SCREENING FOR OTHER SUSPECTED ENDOCRINE DISORDER: ICD-10-CM

## 2025-03-20 DIAGNOSIS — Z13.228 ENCOUNTER FOR SCREENING FOR OTHER SUSPECTED ENDOCRINE DISORDER: ICD-10-CM

## 2025-03-20 DIAGNOSIS — R63.4 ABNORMAL WEIGHT LOSS: ICD-10-CM

## 2025-03-20 DIAGNOSIS — G47.33 OBSTRUCTIVE SLEEP APNEA (ADULT) (PEDIATRIC): ICD-10-CM

## 2025-03-20 PROCEDURE — 99213 OFFICE O/P EST LOW 20 MIN: CPT

## 2025-03-20 RX ORDER — ESCITALOPRAM OXALATE 10 MG/1
10 TABLET ORAL DAILY
Refills: 0 | Status: ACTIVE | COMMUNITY

## 2025-03-20 RX ORDER — SACUBITRIL AND VALSARTAN 49; 51 MG/1; MG/1
49-51 TABLET, FILM COATED ORAL TWICE DAILY
Refills: 0 | Status: ACTIVE | COMMUNITY

## 2025-04-10 ENCOUNTER — NON-APPOINTMENT (OUTPATIENT)
Age: 40
End: 2025-04-10

## 2025-04-29 NOTE — H&P PST ADULT - CONSTITUTIONAL
[Joint Pain] : joint pain [Joint Stiffness] : joint stiffness [Joint Swelling] : joint swelling [Negative] : Heme/Lymph normal/well-groomed/no distress/obese

## 2025-06-25 ENCOUNTER — APPOINTMENT (OUTPATIENT)
Dept: BARIATRICS/WEIGHT MGMT | Facility: CLINIC | Age: 40
End: 2025-06-25
Payer: COMMERCIAL

## 2025-06-25 PROCEDURE — 97803 MED NUTRITION INDIV SUBSEQ: CPT | Mod: 95

## 2025-06-26 ENCOUNTER — APPOINTMENT (OUTPATIENT)
Dept: BARIATRICS | Facility: CLINIC | Age: 40
End: 2025-06-26
Payer: COMMERCIAL

## 2025-06-26 VITALS
BODY MASS INDEX: 44.34 KG/M2 | DIASTOLIC BLOOD PRESSURE: 72 MMHG | TEMPERATURE: 96.8 F | SYSTOLIC BLOOD PRESSURE: 118 MMHG | HEART RATE: 66 BPM | HEIGHT: 68 IN | WEIGHT: 292.6 LBS | OXYGEN SATURATION: 97 %

## 2025-06-26 VITALS — WEIGHT: 288 LBS | BODY MASS INDEX: 43.65 KG/M2 | HEIGHT: 68 IN

## 2025-06-26 PROCEDURE — 99214 OFFICE O/P EST MOD 30 MIN: CPT

## 2025-09-17 ENCOUNTER — APPOINTMENT (OUTPATIENT)
Dept: BARIATRICS/WEIGHT MGMT | Facility: CLINIC | Age: 40
End: 2025-09-17
Payer: COMMERCIAL

## 2025-09-17 VITALS — BODY MASS INDEX: 43.19 KG/M2 | HEIGHT: 68 IN | WEIGHT: 285 LBS

## 2025-09-17 DIAGNOSIS — Z98.84 BARIATRIC SURGERY STATUS: ICD-10-CM

## 2025-09-17 DIAGNOSIS — E66.01 MORBID (SEVERE) OBESITY DUE TO EXCESS CALORIES: ICD-10-CM

## 2025-09-17 PROCEDURE — 97803 MED NUTRITION INDIV SUBSEQ: CPT | Mod: 95

## (undated) DEVICE — PLV/PSP-SUCTION IRRIGATOR STRYKER: Type: DURABLE MEDICAL EQUIPMENT

## (undated) DEVICE — BIOPSY FORCEP RADIAL JAW 4 STANDARD WITH NEEDLE

## (undated) DEVICE — SUT MONOCRYL 4-0 27" PS-2 UNDYED

## (undated) DEVICE — SOL INJ NS 0.9% 500ML 2 PORT

## (undated) DEVICE — BALLOON US ENDO

## (undated) DEVICE — INSUFFLATION NDL COVIDIEN SURGINEEDLE VERESS 120MM

## (undated) DEVICE — LIGASURE L-HOOK 44CM

## (undated) DEVICE — CLAMP BX HOT RAD JAW 3

## (undated) DEVICE — TROCAR COVIDIEN VERSAONE BLUNT TIP HASSAN 12MM

## (undated) DEVICE — CATH IV SAFE BC 20G X 1.16" (PINK)

## (undated) DEVICE — DRAPE TOWEL BLUE 17" X 24"

## (undated) DEVICE — Device

## (undated) DEVICE — FORCEP MULTIBITE MULTIPLE SAMPLE 2.4MM 2.8MM 240CM ORANGE DISP

## (undated) DEVICE — DRAPE 3/4 SHEET W REINFORCEMENT 56X77"

## (undated) DEVICE — PLV-SCD MACHINE: Type: DURABLE MEDICAL EQUIPMENT

## (undated) DEVICE — SYR ALLIANCE II INFLATION 60ML

## (undated) DEVICE — GLV 7 PROTEXIS (WHITE)

## (undated) DEVICE — TUBING SUCTION CONN 6FT STERILE

## (undated) DEVICE — PROTECTOR HEEL / ELBOW FLUFFY

## (undated) DEVICE — SUCTION YANKAUER NO CONTROL VENT

## (undated) DEVICE — ENDOCATCH II 15MM

## (undated) DEVICE — BITE BLOCK ADULT 20 X 27MM (GREEN)

## (undated) DEVICE — TUBING STRYKER PNEUMOSURE HEATED RTP

## (undated) DEVICE — SUT ENDOSTITCH DEVICE 10MM

## (undated) DEVICE — PACK GENERAL LAPAROSCOPY

## (undated) DEVICE — VENODYNE/SCD SLEEVE CALF LARGE

## (undated) DEVICE — SENSOR O2 FINGER ADULT

## (undated) DEVICE — BLADE SCALPEL SAFETYLOCK #15

## (undated) DEVICE — IRRIGATOR BIO SHIELD

## (undated) DEVICE — TUBING IV SET GRAVITY 3Y 100" MACRO

## (undated) DEVICE — CATH IV SAFE BC 22G X 1" (BLUE)

## (undated) DEVICE — STAPLER COVIDIEN ENDO GIA XL HANDLE

## (undated) DEVICE — SUT POLYSORB 0 30" GU-46

## (undated) DEVICE — WARMING BLANKET UPPER ADULT

## (undated) DEVICE — FOLEY HOLDER STATLOCK 2 WAY ADULT

## (undated) DEVICE — TUBING SUCTION 20FT

## (undated) DEVICE — PLV/PSP-ESU FORCEFX F8J7721A: Type: DURABLE MEDICAL EQUIPMENT

## (undated) DEVICE — FORCEP RADIAL JAW 4 JUMBO 2.8MM 3.2MM 240CM ORANGE DISP

## (undated) DEVICE — PACK IV START WITH CHG

## (undated) DEVICE — SYR LUER LOK 50CC

## (undated) DEVICE — TROCAR COVIDIEN VERSAPORT BLADELESS OPTICAL 5MM STANDARD

## (undated) DEVICE — BRUSH COLONOSCOPY CYTOLOGY